# Patient Record
Sex: MALE | Race: WHITE | NOT HISPANIC OR LATINO | Employment: OTHER | URBAN - METROPOLITAN AREA
[De-identification: names, ages, dates, MRNs, and addresses within clinical notes are randomized per-mention and may not be internally consistent; named-entity substitution may affect disease eponyms.]

---

## 2023-03-07 LAB
ACTIVATED PARTIAL THROMBOPLASTIN TIME IN PPP BY COAGULATION ASSAY: 35 SEC (ref 26–39)
ALANINE AMINOTRANSFERASE (SGPT) (U/L) IN SER/PLAS: 12 U/L (ref 10–52)
ALBUMIN (G/DL) IN SER/PLAS: 3.5 G/DL (ref 3.4–5)
ALKALINE PHOSPHATASE (U/L) IN SER/PLAS: 68 U/L (ref 33–136)
ANION GAP IN SER/PLAS: 15 MMOL/L (ref 10–20)
ASPARTATE AMINOTRANSFERASE (SGOT) (U/L) IN SER/PLAS: 15 U/L (ref 9–39)
BASOPHILS (10*3/UL) IN BLOOD BY AUTOMATED COUNT: 0.01 X10E9/L (ref 0–0.1)
BASOPHILS/100 LEUKOCYTES IN BLOOD BY AUTOMATED COUNT: 0.3 % (ref 0–2)
BETA-2-MICROGLOBULIN (MG/L) IN SERUM: 2.9 MG/L (ref 0.7–2.2)
BILIRUBIN TOTAL (MG/DL) IN SER/PLAS: 0.6 MG/DL (ref 0–1.2)
CALCIUM (MG/DL) IN SER/PLAS: 8.6 MG/DL (ref 8.6–10.3)
CARBON DIOXIDE, TOTAL (MMOL/L) IN SER/PLAS: 27 MMOL/L (ref 21–32)
CHLORIDE (MMOL/L) IN SER/PLAS: 96 MMOL/L (ref 98–107)
CREATININE (MG/DL) IN SER/PLAS: 1.06 MG/DL (ref 0.5–1.3)
EOSINOPHILS (10*3/UL) IN BLOOD BY AUTOMATED COUNT: 0.02 X10E9/L (ref 0–0.4)
EOSINOPHILS/100 LEUKOCYTES IN BLOOD BY AUTOMATED COUNT: 0.5 % (ref 0–6)
ERYTHROCYTE DISTRIBUTION WIDTH (RATIO) BY AUTOMATED COUNT: 12.6 % (ref 11.5–14.5)
ERYTHROCYTE MEAN CORPUSCULAR HEMOGLOBIN CONCENTRATION (G/DL) BY AUTOMATED: 31.9 G/DL (ref 32–36)
ERYTHROCYTE MEAN CORPUSCULAR VOLUME (FL) BY AUTOMATED COUNT: 97 FL (ref 80–100)
ERYTHROCYTES (10*6/UL) IN BLOOD BY AUTOMATED COUNT: 3.98 X10E12/L (ref 4.5–5.9)
GFR MALE: 71 ML/MIN/1.73M2
GLUCOSE (MG/DL) IN SER/PLAS: 91 MG/DL (ref 74–99)
HEMATOCRIT (%) IN BLOOD BY AUTOMATED COUNT: 38.6 % (ref 41–52)
HEMOGLOBIN (G/DL) IN BLOOD: 12.3 G/DL (ref 13.5–17.5)
IGA (MG/DL) IN SER/PLAS: <7 MG/DL (ref 70–400)
IGG (MG/DL) IN SER/PLAS: 444 MG/DL (ref 700–1600)
IGM (MG/DL) IN SER/PLAS: 3720 MG/DL (ref 40–230)
IMMATURE GRANULOCYTES/100 LEUKOCYTES IN BLOOD BY AUTOMATED COUNT: 0.3 % (ref 0–0.9)
INR IN PPP BY COAGULATION ASSAY: 1.1 (ref 0.9–1.1)
LACTATE DEHYDROGENASE (U/L) IN SER/PLAS BY LAC->PYR RXN: 135 U/L (ref 84–246)
LEUKOCYTES (10*3/UL) IN BLOOD BY AUTOMATED COUNT: 3.8 X10E9/L (ref 4.4–11.3)
LYMPHOCYTES (10*3/UL) IN BLOOD BY AUTOMATED COUNT: 0.93 X10E9/L (ref 0.8–3)
LYMPHOCYTES/100 LEUKOCYTES IN BLOOD BY AUTOMATED COUNT: 24.8 % (ref 13–44)
MONOCYTES (10*3/UL) IN BLOOD BY AUTOMATED COUNT: 0.39 X10E9/L (ref 0.05–0.8)
MONOCYTES/100 LEUKOCYTES IN BLOOD BY AUTOMATED COUNT: 10.4 % (ref 2–10)
NEUTROPHILS (10*3/UL) IN BLOOD BY AUTOMATED COUNT: 2.39 X10E9/L (ref 1.6–5.5)
NEUTROPHILS/100 LEUKOCYTES IN BLOOD BY AUTOMATED COUNT: 63.7 % (ref 40–80)
PLATELETS (10*3/UL) IN BLOOD AUTOMATED COUNT: 167 X10E9/L (ref 150–450)
POTASSIUM (MMOL/L) IN SER/PLAS: 3.8 MMOL/L (ref 3.5–5.3)
PROTEIN TOTAL: 7.7 G/DL (ref 6.4–8.2)
PROTHROMBIN TIME (PT) IN PPP BY COAGULATION ASSAY: 12.6 SEC (ref 9.8–13.4)
SODIUM (MMOL/L) IN SER/PLAS: 134 MMOL/L (ref 136–145)
UREA NITROGEN (MG/DL) IN SER/PLAS: 13 MG/DL (ref 6–23)

## 2023-03-08 LAB — VISCOSITY SERUM: 3.59 CP (ref 1.5–1.8)

## 2023-03-13 LAB
ALBUMIN ELP: 3.4 G/DL (ref 3.4–5)
ALPHA 1: 0.2 G/DL (ref 0.2–0.6)
ALPHA 2: 0.6 G/DL (ref 0.4–1.1)
BETA: 1.5 G/DL (ref 0.5–1.2)
GAMMA GLOBULIN: 1.9 G/DL (ref 0.5–1.4)
M-PROTEIN 1: 0.6 G/DL
M-PROTEIN 2: 1 G/DL
PATH REVIEW - SERUM IMMUNOFIXATION: NORMAL
PATH REVIEW-SERUM PROTEIN ELECTROPHORESIS: NORMAL
PROTEIN ELECTROPHORESIS INTERPRETATION: ABNORMAL
PROTEIN TOTAL: 7.7 G/DL (ref 6.4–8.2)
SERUM IMMUNOFIXATION INTERPRETATION: ABNORMAL

## 2023-06-12 LAB
ALANINE AMINOTRANSFERASE (SGPT) (U/L) IN SER/PLAS: 13 U/L (ref 10–52)
ALBUMIN (G/DL) IN SER/PLAS: 3.5 G/DL (ref 3.4–5)
ALKALINE PHOSPHATASE (U/L) IN SER/PLAS: 70 U/L (ref 33–136)
ANION GAP IN SER/PLAS: 11 MMOL/L (ref 10–20)
ASPARTATE AMINOTRANSFERASE (SGOT) (U/L) IN SER/PLAS: 16 U/L (ref 9–39)
BASOPHILS (10*3/UL) IN BLOOD BY AUTOMATED COUNT: 0.02 X10E9/L (ref 0–0.1)
BASOPHILS/100 LEUKOCYTES IN BLOOD BY AUTOMATED COUNT: 0.5 % (ref 0–2)
BETA-2-MICROGLOBULIN (MG/L) IN SERUM: 3.5 MG/L (ref 0.7–2.2)
BILIRUBIN TOTAL (MG/DL) IN SER/PLAS: 0.5 MG/DL (ref 0–1.2)
CALCIUM (MG/DL) IN SER/PLAS: 9.2 MG/DL (ref 8.6–10.3)
CARBON DIOXIDE, TOTAL (MMOL/L) IN SER/PLAS: 30 MMOL/L (ref 21–32)
CHLORIDE (MMOL/L) IN SER/PLAS: 101 MMOL/L (ref 98–107)
CREATININE (MG/DL) IN SER/PLAS: 1.11 MG/DL (ref 0.5–1.3)
EOSINOPHILS (10*3/UL) IN BLOOD BY AUTOMATED COUNT: 0.02 X10E9/L (ref 0–0.4)
EOSINOPHILS/100 LEUKOCYTES IN BLOOD BY AUTOMATED COUNT: 0.5 % (ref 0–6)
ERYTHROCYTE DISTRIBUTION WIDTH (RATIO) BY AUTOMATED COUNT: 12.8 % (ref 11.5–14.5)
ERYTHROCYTE MEAN CORPUSCULAR HEMOGLOBIN CONCENTRATION (G/DL) BY AUTOMATED: 31.5 G/DL (ref 32–36)
ERYTHROCYTE MEAN CORPUSCULAR VOLUME (FL) BY AUTOMATED COUNT: 99 FL (ref 80–100)
ERYTHROCYTES (10*6/UL) IN BLOOD BY AUTOMATED COUNT: 3.83 X10E12/L (ref 4.5–5.9)
GFR MALE: 67 ML/MIN/1.73M2
GLUCOSE (MG/DL) IN SER/PLAS: 129 MG/DL (ref 74–99)
HEMATOCRIT (%) IN BLOOD BY AUTOMATED COUNT: 37.8 % (ref 41–52)
HEMOGLOBIN (G/DL) IN BLOOD: 11.9 G/DL (ref 13.5–17.5)
IGM (MG/DL) IN SER/PLAS: 3770 MG/DL (ref 40–230)
IMMATURE GRANULOCYTES/100 LEUKOCYTES IN BLOOD BY AUTOMATED COUNT: 0.3 % (ref 0–0.9)
LEUKOCYTES (10*3/UL) IN BLOOD BY AUTOMATED COUNT: 3.8 X10E9/L (ref 4.4–11.3)
LYMPHOCYTES (10*3/UL) IN BLOOD BY AUTOMATED COUNT: 0.92 X10E9/L (ref 0.8–3)
LYMPHOCYTES/100 LEUKOCYTES IN BLOOD BY AUTOMATED COUNT: 24 % (ref 13–44)
MONOCYTES (10*3/UL) IN BLOOD BY AUTOMATED COUNT: 0.26 X10E9/L (ref 0.05–0.8)
MONOCYTES/100 LEUKOCYTES IN BLOOD BY AUTOMATED COUNT: 6.8 % (ref 2–10)
NEUTROPHILS (10*3/UL) IN BLOOD BY AUTOMATED COUNT: 2.61 X10E9/L (ref 1.6–5.5)
NEUTROPHILS/100 LEUKOCYTES IN BLOOD BY AUTOMATED COUNT: 67.9 % (ref 40–80)
PLATELETS (10*3/UL) IN BLOOD AUTOMATED COUNT: 160 X10E9/L (ref 150–450)
POTASSIUM (MMOL/L) IN SER/PLAS: 4 MMOL/L (ref 3.5–5.3)
PROTEIN TOTAL: 7.8 G/DL (ref 6.4–8.2)
SODIUM (MMOL/L) IN SER/PLAS: 138 MMOL/L (ref 136–145)
UREA NITROGEN (MG/DL) IN SER/PLAS: 16 MG/DL (ref 6–23)

## 2023-06-13 LAB — VISCOSITY SERUM: 3.41 CP (ref 1.5–1.8)

## 2023-06-18 LAB
ALBUMIN ELP: 3.4 G/DL (ref 3.4–5)
ALPHA 1: 0.2 G/DL (ref 0.2–0.6)
ALPHA 2: 0.7 G/DL (ref 0.4–1.1)
BETA: 1.5 G/DL (ref 0.5–1.2)
GAMMA GLOBULIN: 2 G/DL (ref 0.5–1.4)
M-PROTEIN 1: 0.6 G/DL
M-PROTEIN 2: 1.2 G/DL
PATH REVIEW - SERUM IMMUNOFIXATION: NORMAL
PATH REVIEW-SERUM PROTEIN ELECTROPHORESIS: NORMAL
PROTEIN ELECTROPHORESIS INTERPRETATION: ABNORMAL
PROTEIN TOTAL: 7.8 G/DL (ref 6.4–8.2)
SERUM IMMUNOFIXATION INTERPRETATION: ABNORMAL

## 2023-06-22 LAB — SARS-COV-2 RESULT: NOT DETECTED

## 2023-09-07 LAB
ALANINE AMINOTRANSFERASE (SGPT) (U/L) IN SER/PLAS: 14 U/L (ref 10–52)
ALBUMIN (G/DL) IN SER/PLAS: 3.8 G/DL (ref 3.4–5)
ALKALINE PHOSPHATASE (U/L) IN SER/PLAS: 113 U/L (ref 33–136)
ANION GAP IN SER/PLAS: 11 MMOL/L (ref 10–20)
ASPARTATE AMINOTRANSFERASE (SGOT) (U/L) IN SER/PLAS: 18 U/L (ref 9–39)
BASOPHILS (10*3/UL) IN BLOOD BY AUTOMATED COUNT: 0.03 X10E9/L (ref 0–0.1)
BASOPHILS/100 LEUKOCYTES IN BLOOD BY AUTOMATED COUNT: 0.4 % (ref 0–2)
BILIRUBIN TOTAL (MG/DL) IN SER/PLAS: 0.5 MG/DL (ref 0–1.2)
CALCIUM (MG/DL) IN SER/PLAS: 9.1 MG/DL (ref 8.6–10.3)
CARBON DIOXIDE, TOTAL (MMOL/L) IN SER/PLAS: 29 MMOL/L (ref 21–32)
CHLORIDE (MMOL/L) IN SER/PLAS: 104 MMOL/L (ref 98–107)
CREATININE (MG/DL) IN SER/PLAS: 1.35 MG/DL (ref 0.5–1.3)
EOSINOPHILS (10*3/UL) IN BLOOD BY AUTOMATED COUNT: 0.04 X10E9/L (ref 0–0.4)
EOSINOPHILS/100 LEUKOCYTES IN BLOOD BY AUTOMATED COUNT: 0.6 % (ref 0–6)
ERYTHROCYTE DISTRIBUTION WIDTH (RATIO) BY AUTOMATED COUNT: 14.1 % (ref 11.5–14.5)
ERYTHROCYTE MEAN CORPUSCULAR HEMOGLOBIN CONCENTRATION (G/DL) BY AUTOMATED: 32.9 G/DL (ref 32–36)
ERYTHROCYTE MEAN CORPUSCULAR VOLUME (FL) BY AUTOMATED COUNT: 102 FL (ref 80–100)
ERYTHROCYTES (10*6/UL) IN BLOOD BY AUTOMATED COUNT: 3.62 X10E12/L (ref 4.5–5.9)
GFR MALE: 53 ML/MIN/1.73M2
GLUCOSE (MG/DL) IN SER/PLAS: 121 MG/DL (ref 74–99)
HEMATOCRIT (%) IN BLOOD BY AUTOMATED COUNT: 36.8 % (ref 41–52)
HEMOGLOBIN (G/DL) IN BLOOD: 12.1 G/DL (ref 13.5–17.5)
IMMATURE GRANULOCYTES/100 LEUKOCYTES IN BLOOD BY AUTOMATED COUNT: 0.6 % (ref 0–0.9)
LEUKOCYTES (10*3/UL) IN BLOOD BY AUTOMATED COUNT: 7 X10E9/L (ref 4.4–11.3)
LYMPHOCYTES (10*3/UL) IN BLOOD BY AUTOMATED COUNT: 0.16 X10E9/L (ref 0.8–3)
LYMPHOCYTES/100 LEUKOCYTES IN BLOOD BY AUTOMATED COUNT: 2.3 % (ref 13–44)
MONOCYTES (10*3/UL) IN BLOOD BY AUTOMATED COUNT: 0.55 X10E9/L (ref 0.05–0.8)
MONOCYTES/100 LEUKOCYTES IN BLOOD BY AUTOMATED COUNT: 7.8 % (ref 2–10)
NEUTROPHILS (10*3/UL) IN BLOOD BY AUTOMATED COUNT: 6.19 X10E9/L (ref 1.6–5.5)
NEUTROPHILS/100 LEUKOCYTES IN BLOOD BY AUTOMATED COUNT: 88.3 % (ref 40–80)
PLATELETS (10*3/UL) IN BLOOD AUTOMATED COUNT: 153 X10E9/L (ref 150–450)
POTASSIUM (MMOL/L) IN SER/PLAS: 4.8 MMOL/L (ref 3.5–5.3)
PROTEIN TOTAL: 6.9 G/DL (ref 6.4–8.2)
SODIUM (MMOL/L) IN SER/PLAS: 139 MMOL/L (ref 136–145)
UREA NITROGEN (MG/DL) IN SER/PLAS: 24 MG/DL (ref 6–23)

## 2023-10-04 PROBLEM — H52.203 ASTIGMATISM OF BOTH EYES WITH PRESBYOPIA: Status: ACTIVE | Noted: 2023-10-04

## 2023-10-04 PROBLEM — Z86.69 HISTORY OF RETINAL DETACHMENT: Status: ACTIVE | Noted: 2023-10-04

## 2023-10-04 PROBLEM — C88.00: Status: ACTIVE | Noted: 2023-10-04

## 2023-10-04 PROBLEM — H52.4 ASTIGMATISM OF BOTH EYES WITH PRESBYOPIA: Status: ACTIVE | Noted: 2023-10-04

## 2023-10-04 PROBLEM — H34.239 BRAO (BRANCH RETINAL ARTERY OCCLUSION): Status: ACTIVE | Noted: 2023-10-04

## 2023-10-04 PROBLEM — B02.9 VARICELLA-ZOSTER: Status: ACTIVE | Noted: 2023-10-04

## 2023-10-04 PROBLEM — I72.8 SPLENIC ARTERY ANEURYSM (CMS-HCC): Status: ACTIVE | Noted: 2023-10-04

## 2023-10-04 PROBLEM — Z85.9 PERSONAL HISTORY OF MALIGNANT NEOPLASM, UNSPECIFIED: Status: ACTIVE | Noted: 2023-10-04

## 2023-10-04 PROBLEM — C88.0: Status: ACTIVE | Noted: 2023-10-04

## 2023-10-04 PROBLEM — C91.40 HAIRY CELL LEUKEMIA (MULTI): Status: ACTIVE | Noted: 2023-10-04

## 2023-10-04 RX ORDER — ASPIRIN 81 MG/1
TABLET ORAL
COMMUNITY

## 2023-10-05 ENCOUNTER — OFFICE VISIT (OUTPATIENT)
Dept: HEMATOLOGY/ONCOLOGY | Facility: HOSPITAL | Age: 83
End: 2023-10-05
Payer: MEDICARE

## 2023-10-05 VITALS
DIASTOLIC BLOOD PRESSURE: 76 MMHG | RESPIRATION RATE: 16 BRPM | HEIGHT: 71 IN | OXYGEN SATURATION: 99 % | SYSTOLIC BLOOD PRESSURE: 138 MMHG | TEMPERATURE: 97.5 F | WEIGHT: 217.5 LBS | BODY MASS INDEX: 30.45 KG/M2 | HEART RATE: 72 BPM

## 2023-10-05 DIAGNOSIS — C88.0: Primary | ICD-10-CM

## 2023-10-05 PROCEDURE — 1125F AMNT PAIN NOTED PAIN PRSNT: CPT | Performed by: NURSE PRACTITIONER

## 2023-10-05 PROCEDURE — 99213 OFFICE O/P EST LOW 20 MIN: CPT | Performed by: NURSE PRACTITIONER

## 2023-10-05 PROCEDURE — 1159F MED LIST DOCD IN RCRD: CPT | Performed by: NURSE PRACTITIONER

## 2023-10-05 PROCEDURE — 1036F TOBACCO NON-USER: CPT | Performed by: NURSE PRACTITIONER

## 2023-10-05 ASSESSMENT — ENCOUNTER SYMPTOMS
DEPRESSION: 0
OCCASIONAL FEELINGS OF UNSTEADINESS: 0
LOSS OF SENSATION IN FEET: 0

## 2023-10-05 ASSESSMENT — PAIN SCALES - GENERAL: PAINLEVEL: 6

## 2023-10-09 ASSESSMENT — ENCOUNTER SYMPTOMS
CARDIOVASCULAR NEGATIVE: 1
PSYCHIATRIC NEGATIVE: 1
ENDOCRINE NEGATIVE: 1
RESPIRATORY NEGATIVE: 1
CONSTITUTIONAL NEGATIVE: 1
NEUROLOGICAL NEGATIVE: 1
HEMATOLOGIC/LYMPHATIC NEGATIVE: 1
MUSCULOSKELETAL NEGATIVE: 1
GASTROINTESTINAL NEGATIVE: 1
EYES NEGATIVE: 1

## 2023-10-09 NOTE — PROGRESS NOTES
Patient ID: Carlton Rose is a 82 y.o. male.  Referring Physician: Snow Pickett, APRN-CNP  [address]  Department of Hematology [place] [address]  Primary Care Provider: Willie Viera MD  Visit Type: Follow Up      Subjective    For LPL MZL in 2013  rituximab, chlorambucil, bendamustine. reported to have severe reactions to rituximab, managed with heavy premedication.     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 He was previously treated with plasmapheresis followed by rituximab, but had severe infusion reaction. He then switched a different premedication regimen, with dexamethasone that was started 1 day before the infusion. Other regimens were described above. He tolerated the treatment well. He has not been treated for the past few years.   Upon evaluating in June 2023, his presentation was consistent with WM in relapse. IgM was 3770 mg/dL. It was decided to started R-andrea. With his history of reaction, first dose ritux was given as an inpt and well tolerated. So far he completed 2 cycles of R-andrea, with C1 andrea at 60 and 70mg/m2, and C2 andrea dose at 90 mg/m2.  The patient presents for follow up visit prior to C5 BR (at [facility]). Due to receive C5 on 10/19-10/20/23. Tolerating therapy well with mild fatigue. Able to complete daily activities without limitation.   No fever, chills or night sweats. No lymphadenopathy or masses.             Review of Systems   Constitutional: Negative.    HENT:  Negative.    "  Eyes: Negative.    Respiratory: Negative.     Cardiovascular: Negative.    Gastrointestinal: Negative.    Endocrine: Negative.    Genitourinary: Negative.     Musculoskeletal: Negative.    Skin: Negative.    Neurological: Negative.    Hematological: Negative.    Psychiatric/Behavioral: Negative.          Objective   BSA: 2.23 meters squared  /76 (BP Location: Left arm, Patient Position: Sitting, BP Cuff Size: Large adult)   Pulse 72   Temp 36.4 °C (97.5 °F) (Temporal)   Resp 16   Ht (S) 1.807 m (5' 11.14\") Comment: height verification  Wt 98.7 kg (217 lb 8 oz)   SpO2 99%   BMI 30.21 kg/m²      has a past medical history of Aneurysm of other specified arteries (CMS/HCC), Decreased white blood cell count, unspecified, Familial hypercholesterolemia, Nonfamilial hypogammaglobulinemia (CMS/HCC), Other conditions influencing health status, Other conditions influencing health status, Personal history of diseases of the blood and blood-forming organs and certain disorders involving the immune mechanism, and Personal history of diseases of the blood and blood-forming organs and certain disorders involving the immune mechanism.   has a past surgical history that includes Cataract extraction (09/05/2013) and Other surgical history (12/04/2020).  Family History   Problem Relation Name Age of Onset    Alzheimer's disease Mother      Dementia Mother      Cataracts Mother      Heart failure Father      Cataracts Father      Hypertension Brother      Peripheral vascular disease Brother       Oncology History    No history exists.       Carlton Rose  reports that he has never smoked. He has never used smokeless tobacco.  He  reports no history of alcohol use.  He  reports no history of drug use.    Physical Exam  Vitals reviewed.   Constitutional:       Appearance: Normal appearance.   HENT:      Head: Normocephalic and atraumatic.      Nose: Nose normal.      Mouth/Throat:      Mouth: Mucous membranes are moist.     "  Pharynx: Oropharynx is clear.   Eyes:      Extraocular Movements: Extraocular movements intact.      Conjunctiva/sclera: Conjunctivae normal.      Pupils: Pupils are equal, round, and reactive to light.   Cardiovascular:      Rate and Rhythm: Normal rate and regular rhythm.      Pulses: Normal pulses.      Heart sounds: Normal heart sounds.   Pulmonary:      Effort: Pulmonary effort is normal.      Breath sounds: Normal breath sounds.   Abdominal:      General: Bowel sounds are normal.      Palpations: Abdomen is soft.   Musculoskeletal:         General: Normal range of motion.      Cervical back: Normal range of motion.   Skin:     General: Skin is warm and dry.   Neurological:      General: No focal deficit present.      Mental Status: He is alert and oriented to person, place, and time.   Psychiatric:         Mood and Affect: Mood normal.         Behavior: Behavior normal.         Thought Content: Thought content normal.         Judgment: Judgment normal.         WBC   Date/Time Value Ref Range Status   09/21/2023 09:40 AM 3.7 (L) 4.4 - 11.3 x10E9/L Final   09/07/2023 11:00 AM 7.0 4.4 - 11.3 x10E9/L Final   08/24/2023 09:20 AM 5.6 4.4 - 11.3 x10E9/L Final     nRBC   Date Value Ref Range Status   06/26/2023 0.0 0.0 - 0.0 /100 WBC Final   06/25/2023 0.0 0.0 - 0.0 /100 WBC Final   06/24/2023 0.0 0.0 - 0.0 /100 WBC Final     RBC   Date Value Ref Range Status   09/21/2023 3.47 (L) 4.50 - 5.90 x10E12/L Final   09/07/2023 3.62 (L) 4.50 - 5.90 x10E12/L Final   08/24/2023 3.71 (L) 4.50 - 5.90 x10E12/L Final     Hemoglobin   Date Value Ref Range Status   09/21/2023 11.7 (L) 13.5 - 17.5 g/dL Final   09/07/2023 12.1 (L) 13.5 - 17.5 g/dL Final   08/24/2023 12.0 (L) 13.5 - 17.5 g/dL Final     Hematocrit   Date Value Ref Range Status   09/21/2023 34.0 (L) 41.0 - 52.0 % Final   09/07/2023 36.8 (L) 41.0 - 52.0 % Final   08/24/2023 35.8 (L) 41.0 - 52.0 % Final     MCV   Date/Time Value Ref Range Status   09/21/2023 09:40 AM 98 80  "- 100 fL Final   09/07/2023 11:00  (H) 80 - 100 fL Final   08/24/2023 09:20 AM 96 80 - 100 fL Final     No results found for: \"MCH\"  MCHC   Date/Time Value Ref Range Status   09/21/2023 09:40 AM 34.4 32.0 - 36.0 g/dL Final   09/07/2023 11:00 AM 32.9 32.0 - 36.0 g/dL Final   08/24/2023 09:20 AM 33.5 32.0 - 36.0 g/dL Final     RDW   Date/Time Value Ref Range Status   09/21/2023 09:40 AM 13.5 11.5 - 14.5 % Final   09/07/2023 11:00 AM 14.1 11.5 - 14.5 % Final   08/24/2023 09:20 AM 13.6 11.5 - 14.5 % Final     Platelets   Date/Time Value Ref Range Status   09/21/2023 09:40  150 - 450 x10E9/L Final   09/07/2023 11:00  150 - 450 x10E9/L Final   08/24/2023 09:20  (L) 150 - 450 x10E9/L Final     No results found for: \"MPV\"  Neutrophils %   Date/Time Value Ref Range Status   09/21/2023 09:40 AM 94.5 40.0 - 80.0 % Final   09/07/2023 11:00 AM 88.3 40.0 - 80.0 % Final   08/24/2023 09:20 AM 97.7 40.0 - 80.0 % Final     Immature Granulocytes %, Automated   Date/Time Value Ref Range Status   09/21/2023 09:40 AM 0.3 0.0 - 0.9 % Final     Comment:      Immature Granulocyte Count (IG) includes promyelocytes,    myelocytes and metamyelocytes but does not include bands.   Percent differential counts (%) should be interpreted in the   context of the absolute cell counts (cells/L).     09/07/2023 11:00 AM 0.6 0.0 - 0.9 % Final     Comment:      Immature Granulocyte Count (IG) includes promyelocytes,    myelocytes and metamyelocytes but does not include bands.   Percent differential counts (%) should be interpreted in the   context of the absolute cell counts (cells/L).     08/24/2023 09:20 AM 0.2 0.0 - 0.9 % Final     Comment:      Immature Granulocyte Count (IG) includes promyelocytes,    myelocytes and metamyelocytes but does not include bands.   Percent differential counts (%) should be interpreted in the   context of the absolute cell counts (cells/L).       Lymphocytes %   Date/Time Value Ref Range Status " "  09/21/2023 09:40 AM 3.8 13.0 - 44.0 % Final   09/07/2023 11:00 AM 2.3 13.0 - 44.0 % Final   08/24/2023 09:20 AM 1.4 13.0 - 44.0 % Final     Monocytes %   Date/Time Value Ref Range Status   09/21/2023 09:40 AM 1.1 2.0 - 10.0 % Final   09/07/2023 11:00 AM 7.8 2.0 - 10.0 % Final   08/24/2023 09:20 AM 0.7 2.0 - 10.0 % Final     Eosinophils %   Date/Time Value Ref Range Status   09/07/2023 11:00 AM 0.6 0.0 - 6.0 % Final   08/10/2023 03:02 PM 0.5 0.0 - 6.0 % Final   07/13/2023 01:47 PM 0.5 0.0 - 6.0 % Final     Basophils %   Date/Time Value Ref Range Status   09/21/2023 09:40 AM 0.3 0.0 - 2.0 % Final   09/07/2023 11:00 AM 0.4 0.0 - 2.0 % Final   08/10/2023 03:02 PM 0.3 0.0 - 2.0 % Final     Neutrophils Absolute   Date/Time Value Ref Range Status   09/21/2023 09:40 AM 3.46 1.60 - 5.50 x10E9/L Final   09/07/2023 11:00 AM 6.19 (H) 1.60 - 5.50 x10E9/L Final   08/24/2023 09:20 AM 5.49 1.60 - 5.50 x10E9/L Final     No results found for: \"IGABSOL\"  Lymphocytes Absolute   Date/Time Value Ref Range Status   09/21/2023 09:40 AM 0.14 (L) 0.80 - 3.00 x10E9/L Final   09/07/2023 11:00 AM 0.16 (L) 0.80 - 3.00 x10E9/L Final   08/24/2023 09:20 AM 0.08 (L) 0.80 - 3.00 x10E9/L Final     Monocytes Absolute   Date/Time Value Ref Range Status   09/21/2023 09:40 AM 0.04 (L) 0.05 - 0.80 x10E9/L Final   09/07/2023 11:00 AM 0.55 0.05 - 0.80 x10E9/L Final   08/24/2023 09:20 AM 0.04 (L) 0.05 - 0.80 x10E9/L Final     Eosinophils Absolute   Date/Time Value Ref Range Status   09/07/2023 11:00 AM 0.04 0.00 - 0.40 x10E9/L Final   08/10/2023 03:02 PM 0.04 0.00 - 0.40 x10E9/L Final   07/13/2023 01:47 PM 0.04 0.00 - 0.40 x10E9/L Final     Basophils Absolute   Date/Time Value Ref Range Status   09/21/2023 09:40 AM 0.01 0.00 - 0.10 x10E9/L Final   09/07/2023 11:00 AM 0.03 0.00 - 0.10 x10E9/L Final   08/10/2023 03:02 PM 0.02 0.00 - 0.10 x10E9/L Final       No components found for: \"PT\"  aPTT   Date/Time Value Ref Range Status   06/24/2023 10:18 PM 28 27 - 38 " sec Final     Comment:     Note new reference range as of 6/20/2023 at 10:00am.   03/07/2023 11:33 AM 35 26 - 39 sec Final     Comment:       THE APTT IS NO LONGER USED FOR MONITORING     UNFRACTIONATED HEPARIN THERAPY.    FOR MONITORING HEPARIN THERAPY,     USE THE HEPARIN ASSAY.         Assessment/Plan         Problem List Items Addressed This Visit             ICD-10-CM       Hematology and Neoplasia    Waldenstrom's macroglobulinemia with kappa light chains (CMS/HCC) - Primary C88.0     #WM  -Based on the BMBx in 2020, the MYD88 mutation, and the increase in IgM and viscosity, the diagnosis is consistent with WM.   -IgM has been steadily increasing, from the low of 1070 in Jan 2021 to 3770 mg/dL in June 2023.   -In addition, both hgb and PLT are mildly decreased.   -Given his age, this may be an ideal time to intervene, with the goal to reduce disease burden, prevent disease related complications.     #Treatment  -Discussed BTKi, vs rituximab, and R-Anthony.   -patient is more interested in a treatment with a finite duration. Therefore discussed thoroughly the AEs of rituximab, bendamustine, and the drugs in combination. In particular, risks of severe/fatal infections were discussed. PML was also discussed. Severe and protracted cytoepenia or aplasia were also discussed. Patient consented.   -Appropriate testing for HBV and other prophylaxis will be taken, including G-CSF, Bactrim for PJP, and acyclovir.   -Given severe reaction to ritux, he will receive 1st dose of ritux as an inpatient. Subsequently he will receive R-Anthony as an outpatient.   -The above approach proved safe. He tolerated 4 cycles of Rbenda.   Plan:    C5 Rbenda scheduled on 10/19-10/20/23.    Prophylaxis: acyclovir, gcsf, bactrim.   -RTC in 3 weeks with MD. Snow Pickett, APRN-CNP

## 2023-10-09 NOTE — ASSESSMENT & PLAN NOTE
#WM  -Based on the BMBx in 2020, the MYD88 mutation, and the increase in IgM and viscosity, the diagnosis is consistent with WM.   -IgM has been steadily increasing, from the low of 1070 in Jan 2021 to 3770 mg/dL in June 2023.   -In addition, both hgb and PLT are mildly decreased.   -Given his age, this may be an ideal time to intervene, with the goal to reduce disease burden, prevent disease related complications.     #Treatment  -Discussed BTKi, vs rituximab, and R-Anthony.   -patient is more interested in a treatment with a finite duration. Therefore discussed thoroughly the AEs of rituximab, bendamustine, and the drugs in combination. In particular, risks of severe/fatal infections were discussed. PML was also discussed. Severe and protracted cytoepenia or aplasia were also discussed. Patient consented.   -Appropriate testing for HBV and other prophylaxis will be taken, including G-CSF, Bactrim for PJP, and acyclovir.   -Given severe reaction to ritux, he will receive 1st dose of ritux as an inpatient. Subsequently he will receive R-Anthony as an outpatient.   -The above approach proved safe. He tolerated 4 cycles of Rbenda.   Plan:    C5 Rbenda scheduled on 10/19-10/20/23.    Prophylaxis: acyclovir, gcsf, bactrim.   -RTC in 3 weeks with MD.

## 2023-10-19 ENCOUNTER — SOCIAL WORK (OUTPATIENT)
Dept: CASE MANAGEMENT | Facility: HOSPITAL | Age: 83
End: 2023-10-19
Payer: MEDICARE

## 2023-10-19 ENCOUNTER — INFUSION (OUTPATIENT)
Dept: HEMATOLOGY/ONCOLOGY | Facility: CLINIC | Age: 83
End: 2023-10-19
Payer: MEDICARE

## 2023-10-19 VITALS
OXYGEN SATURATION: 95 % | HEART RATE: 74 BPM | TEMPERATURE: 98.1 F | SYSTOLIC BLOOD PRESSURE: 124 MMHG | DIASTOLIC BLOOD PRESSURE: 66 MMHG | HEIGHT: 71 IN | RESPIRATION RATE: 16 BRPM | WEIGHT: 217.37 LBS | BODY MASS INDEX: 30.43 KG/M2

## 2023-10-19 DIAGNOSIS — C88.0: ICD-10-CM

## 2023-10-19 DIAGNOSIS — C88.0: Primary | ICD-10-CM

## 2023-10-19 LAB
ALBUMIN SERPL BCP-MCNC: 3.8 G/DL (ref 3.4–5)
ALP SERPL-CCNC: 81 U/L (ref 33–136)
ALT SERPL W P-5'-P-CCNC: 11 U/L (ref 10–52)
ANION GAP SERPL CALC-SCNC: 13 MMOL/L (ref 10–20)
AST SERPL W P-5'-P-CCNC: 15 U/L (ref 9–39)
BASOPHILS # BLD AUTO: 0 X10*3/UL (ref 0–0.1)
BASOPHILS NFR BLD AUTO: 0 %
BILIRUB SERPL-MCNC: 0.5 MG/DL (ref 0–1.2)
BUN SERPL-MCNC: 21 MG/DL (ref 6–23)
CALCIUM SERPL-MCNC: 9.1 MG/DL (ref 8.6–10.3)
CHLORIDE SERPL-SCNC: 104 MMOL/L (ref 98–107)
CO2 SERPL-SCNC: 24 MMOL/L (ref 21–32)
CREAT SERPL-MCNC: 1.19 MG/DL (ref 0.5–1.3)
EOSINOPHIL # BLD AUTO: 0 X10*3/UL (ref 0–0.4)
EOSINOPHIL NFR BLD AUTO: 0 %
ERYTHROCYTE [DISTWIDTH] IN BLOOD BY AUTOMATED COUNT: 12.9 % (ref 11.5–14.5)
GFR SERPL CREATININE-BSD FRML MDRD: 62 ML/MIN/1.73M*2
GLUCOSE SERPL-MCNC: 167 MG/DL (ref 74–99)
HCT VFR BLD AUTO: 35.8 % (ref 41–52)
HGB BLD-MCNC: 12.2 G/DL (ref 13.5–17.5)
IMM GRANULOCYTES # BLD AUTO: 0.01 X10*3/UL (ref 0–0.5)
IMM GRANULOCYTES NFR BLD AUTO: 0.2 % (ref 0–0.9)
LDH SERPL L TO P-CCNC: 160 U/L (ref 84–246)
LYMPHOCYTES # BLD AUTO: 0.14 X10*3/UL (ref 0.8–3)
LYMPHOCYTES NFR BLD AUTO: 2.7 %
MCH RBC QN AUTO: 33.6 PG (ref 26–34)
MCHC RBC AUTO-ENTMCNC: 34.1 G/DL (ref 32–36)
MCV RBC AUTO: 99 FL (ref 80–100)
MONOCYTES # BLD AUTO: 0.05 X10*3/UL (ref 0.05–0.8)
MONOCYTES NFR BLD AUTO: 1 %
NEUTROPHILS # BLD AUTO: 4.99 X10*3/UL (ref 1.6–5.5)
NEUTROPHILS NFR BLD AUTO: 96.1 %
PLATELET # BLD AUTO: 152 X10*3/UL (ref 150–450)
PMV BLD AUTO: 9.2 FL (ref 7.5–11.5)
POTASSIUM SERPL-SCNC: 4.4 MMOL/L (ref 3.5–5.3)
PROT SERPL-MCNC: 6.6 G/DL (ref 6.4–8.2)
RBC # BLD AUTO: 3.63 X10*6/UL (ref 4.5–5.9)
SODIUM SERPL-SCNC: 137 MMOL/L (ref 136–145)
URATE SERPL-MCNC: 5.1 MG/DL (ref 4–7.5)
WBC # BLD AUTO: 5.2 X10*3/UL (ref 4.4–11.3)

## 2023-10-19 PROCEDURE — 96367 TX/PROPH/DG ADDL SEQ IV INF: CPT

## 2023-10-19 PROCEDURE — 84550 ASSAY OF BLOOD/URIC ACID: CPT

## 2023-10-19 PROCEDURE — 96366 THER/PROPH/DIAG IV INF ADDON: CPT

## 2023-10-19 PROCEDURE — 83615 LACTATE (LD) (LDH) ENZYME: CPT

## 2023-10-19 PROCEDURE — 84075 ASSAY ALKALINE PHOSPHATASE: CPT

## 2023-10-19 PROCEDURE — 96409 CHEMO IV PUSH SNGL DRUG: CPT

## 2023-10-19 PROCEDURE — 2500000001 HC RX 250 WO HCPCS SELF ADMINISTERED DRUGS (ALT 637 FOR MEDICARE OP): Performed by: INTERNAL MEDICINE

## 2023-10-19 PROCEDURE — 2500000004 HC RX 250 GENERAL PHARMACY W/ HCPCS (ALT 636 FOR OP/ED): Mod: JG | Performed by: INTERNAL MEDICINE

## 2023-10-19 PROCEDURE — 36415 COLL VENOUS BLD VENIPUNCTURE: CPT

## 2023-10-19 PROCEDURE — 85025 COMPLETE CBC W/AUTO DIFF WBC: CPT

## 2023-10-19 PROCEDURE — 96375 TX/PRO/DX INJ NEW DRUG ADDON: CPT

## 2023-10-19 PROCEDURE — 2500000004 HC RX 250 GENERAL PHARMACY W/ HCPCS (ALT 636 FOR OP/ED): Performed by: INTERNAL MEDICINE

## 2023-10-19 RX ORDER — DIPHENHYDRAMINE HYDROCHLORIDE 50 MG/ML
50 INJECTION INTRAMUSCULAR; INTRAVENOUS AS NEEDED
Status: CANCELLED | OUTPATIENT
Start: 2023-10-21

## 2023-10-19 RX ORDER — ACETAMINOPHEN 325 MG/1
650 TABLET ORAL ONCE
Status: CANCELLED | OUTPATIENT
Start: 2023-10-19

## 2023-10-19 RX ORDER — PROCHLORPERAZINE EDISYLATE 5 MG/ML
10 INJECTION INTRAMUSCULAR; INTRAVENOUS EVERY 6 HOURS PRN
Status: DISCONTINUED | OUTPATIENT
Start: 2023-10-19 | End: 2023-10-19 | Stop reason: HOSPADM

## 2023-10-19 RX ORDER — HEPARIN 100 UNIT/ML
500 SYRINGE INTRAVENOUS AS NEEDED
OUTPATIENT
Start: 2023-10-19

## 2023-10-19 RX ORDER — HEPARIN SODIUM,PORCINE/PF 10 UNIT/ML
50 SYRINGE (ML) INTRAVENOUS AS NEEDED
OUTPATIENT
Start: 2023-10-19

## 2023-10-19 RX ORDER — ACETAMINOPHEN 325 MG/1
650 TABLET ORAL ONCE
Status: COMPLETED | OUTPATIENT
Start: 2023-10-19 | End: 2023-10-19

## 2023-10-19 RX ORDER — PALONOSETRON 0.05 MG/ML
0.25 INJECTION, SOLUTION INTRAVENOUS ONCE
Status: COMPLETED | OUTPATIENT
Start: 2023-10-19 | End: 2023-10-19

## 2023-10-19 RX ORDER — EPINEPHRINE 0.3 MG/.3ML
0.3 INJECTION SUBCUTANEOUS EVERY 5 MIN PRN
Status: DISCONTINUED | OUTPATIENT
Start: 2023-10-19 | End: 2023-10-19 | Stop reason: HOSPADM

## 2023-10-19 RX ORDER — PROCHLORPERAZINE MALEATE 10 MG
10 TABLET ORAL EVERY 6 HOURS PRN
Status: CANCELLED | OUTPATIENT
Start: 2023-10-20

## 2023-10-19 RX ORDER — DIPHENHYDRAMINE HYDROCHLORIDE 50 MG/ML
50 INJECTION INTRAMUSCULAR; INTRAVENOUS AS NEEDED
Status: DISCONTINUED | OUTPATIENT
Start: 2023-10-19 | End: 2023-10-19 | Stop reason: HOSPADM

## 2023-10-19 RX ORDER — FAMOTIDINE 10 MG/ML
40 INJECTION INTRAVENOUS ONCE
Status: CANCELLED
Start: 2023-10-19 | End: 2023-10-19

## 2023-10-19 RX ORDER — FAMOTIDINE 10 MG/ML
20 INJECTION INTRAVENOUS ONCE AS NEEDED
Status: CANCELLED | OUTPATIENT
Start: 2023-10-19

## 2023-10-19 RX ORDER — EPINEPHRINE 0.3 MG/.3ML
0.3 INJECTION SUBCUTANEOUS EVERY 5 MIN PRN
Status: CANCELLED | OUTPATIENT
Start: 2023-10-21

## 2023-10-19 RX ORDER — FAMOTIDINE 10 MG/ML
20 INJECTION INTRAVENOUS ONCE AS NEEDED
Status: CANCELLED | OUTPATIENT
Start: 2023-10-20

## 2023-10-19 RX ORDER — EPINEPHRINE 0.3 MG/.3ML
0.3 INJECTION SUBCUTANEOUS EVERY 5 MIN PRN
Status: CANCELLED | OUTPATIENT
Start: 2023-10-20

## 2023-10-19 RX ORDER — ALBUTEROL SULFATE 0.83 MG/ML
3 SOLUTION RESPIRATORY (INHALATION) AS NEEDED
Status: CANCELLED | OUTPATIENT
Start: 2023-10-19

## 2023-10-19 RX ORDER — PROCHLORPERAZINE EDISYLATE 5 MG/ML
10 INJECTION INTRAMUSCULAR; INTRAVENOUS EVERY 6 HOURS PRN
Status: CANCELLED | OUTPATIENT
Start: 2023-10-19

## 2023-10-19 RX ORDER — DIPHENHYDRAMINE HYDROCHLORIDE 50 MG/ML
50 INJECTION INTRAMUSCULAR; INTRAVENOUS AS NEEDED
Status: CANCELLED | OUTPATIENT
Start: 2023-10-19

## 2023-10-19 RX ORDER — EPINEPHRINE 0.3 MG/.3ML
0.3 INJECTION SUBCUTANEOUS EVERY 5 MIN PRN
Status: CANCELLED | OUTPATIENT
Start: 2023-10-19

## 2023-10-19 RX ORDER — PROCHLORPERAZINE MALEATE 10 MG
10 TABLET ORAL EVERY 6 HOURS PRN
Status: DISCONTINUED | OUTPATIENT
Start: 2023-10-19 | End: 2023-10-19 | Stop reason: HOSPADM

## 2023-10-19 RX ORDER — DIPHENHYDRAMINE HCL 50 MG
50 CAPSULE ORAL ONCE
Status: CANCELLED | OUTPATIENT
Start: 2023-10-19

## 2023-10-19 RX ORDER — DIPHENHYDRAMINE HYDROCHLORIDE 50 MG/ML
50 INJECTION INTRAMUSCULAR; INTRAVENOUS AS NEEDED
Status: CANCELLED | OUTPATIENT
Start: 2023-10-20

## 2023-10-19 RX ORDER — PROCHLORPERAZINE MALEATE 10 MG
10 TABLET ORAL EVERY 6 HOURS PRN
Status: CANCELLED | OUTPATIENT
Start: 2023-10-19

## 2023-10-19 RX ORDER — ALBUTEROL SULFATE 0.83 MG/ML
3 SOLUTION RESPIRATORY (INHALATION) AS NEEDED
Status: CANCELLED | OUTPATIENT
Start: 2023-10-20

## 2023-10-19 RX ORDER — FAMOTIDINE 10 MG/ML
20 INJECTION INTRAVENOUS ONCE AS NEEDED
Status: CANCELLED | OUTPATIENT
Start: 2023-10-21

## 2023-10-19 RX ORDER — DIPHENHYDRAMINE HCL 25 MG
50 CAPSULE ORAL ONCE
Status: COMPLETED | OUTPATIENT
Start: 2023-10-19 | End: 2023-10-19

## 2023-10-19 RX ORDER — PROCHLORPERAZINE EDISYLATE 5 MG/ML
10 INJECTION INTRAMUSCULAR; INTRAVENOUS EVERY 6 HOURS PRN
Status: CANCELLED | OUTPATIENT
Start: 2023-10-20

## 2023-10-19 RX ORDER — PALONOSETRON 0.05 MG/ML
0.25 INJECTION, SOLUTION INTRAVENOUS ONCE
Status: CANCELLED | OUTPATIENT
Start: 2023-10-19

## 2023-10-19 RX ORDER — FAMOTIDINE 10 MG/ML
20 INJECTION INTRAVENOUS ONCE AS NEEDED
Status: DISCONTINUED | OUTPATIENT
Start: 2023-10-19 | End: 2023-10-19 | Stop reason: HOSPADM

## 2023-10-19 RX ORDER — ALBUTEROL SULFATE 0.83 MG/ML
3 SOLUTION RESPIRATORY (INHALATION) AS NEEDED
Status: DISCONTINUED | OUTPATIENT
Start: 2023-10-19 | End: 2023-10-19 | Stop reason: HOSPADM

## 2023-10-19 RX ORDER — FAMOTIDINE 10 MG/ML
40 INJECTION INTRAVENOUS ONCE
Status: COMPLETED | OUTPATIENT
Start: 2023-10-19 | End: 2023-10-19

## 2023-10-19 RX ORDER — ALBUTEROL SULFATE 0.83 MG/ML
3 SOLUTION RESPIRATORY (INHALATION) AS NEEDED
Status: CANCELLED | OUTPATIENT
Start: 2023-10-21

## 2023-10-19 RX ADMIN — FAMOTIDINE 40 MG: 10 INJECTION INTRAVENOUS at 10:39

## 2023-10-19 RX ADMIN — ACETAMINOPHEN 650 MG: 325 TABLET ORAL at 10:40

## 2023-10-19 RX ADMIN — Medication 12 MG: at 10:15

## 2023-10-19 RX ADMIN — DIPHENHYDRAMINE HYDROCHLORIDE 50 MG: 25 CAPSULE ORAL at 10:40

## 2023-10-19 RX ADMIN — METHYLPREDNISOLONE SODIUM SUCCINATE 20 MG: 40 INJECTION, POWDER, FOR SOLUTION INTRAMUSCULAR; INTRAVENOUS at 10:15

## 2023-10-19 RX ADMIN — METHYLPREDNISOLONE SODIUM SUCCINATE 40 MG: 40 INJECTION, POWDER, FOR SOLUTION INTRAMUSCULAR; INTRAVENOUS at 10:39

## 2023-10-19 RX ADMIN — BENDAMUSTINE HYDROCHLORIDE 180 MG: 25 INJECTION, SOLUTION INTRAVENOUS at 16:19

## 2023-10-19 RX ADMIN — PALONOSETRON 250 MCG: 0.05 INJECTION, SOLUTION INTRAVENOUS at 10:39

## 2023-10-19 RX ADMIN — RITUXIMAB 800 MG: 10 INJECTION, SOLUTION INTRAVENOUS at 11:57

## 2023-10-19 ASSESSMENT — PAIN SCALES - GENERAL: PAINLEVEL: 0-NO PAIN

## 2023-10-19 NOTE — SIGNIFICANT EVENT
10/19/23 1002   Prechemo Checklist   Has the patient been in the hospital, ED, or urgent care since last date of service No   Chemo/Immuno Consent Signed Yes   Protocol/Indications Verified Yes   Confirmed to previous date/time of medication Yes   Compared to previous dose Yes   All medications are dated accurately Yes   Pregnancy Test Negative Not applicable   Parameters Met Yes   BSA/Weight-Height Verified Yes   Dose Calculations Verified Yes

## 2023-10-19 NOTE — PROGRESS NOTES
SW met with patient and his daughter today to assess needs and offer support.  Patient was A&Ox3 with appropriate and congruent mood and affect. SW asked how his treatment has been going.  Patient stated that he has been more tired.  Patient stated that he was going to get through it.  Patient's daughter helped him set up his My Chart.  Patient reported no SW needs at this time.  SW will continue to follow patient.      Andrew Dawkins MSW, LSW

## 2023-10-19 NOTE — PROGRESS NOTES
.Patient tolerated treatment well, no reaction noted. Patient feels well and has no complaints, vital signs stable. Patient discharged in stable condition with no further needs. Patient returns tomorrow 10/20/2023 for Day 2.

## 2023-10-20 ENCOUNTER — APPOINTMENT (OUTPATIENT)
Dept: HEMATOLOGY/ONCOLOGY | Facility: CLINIC | Age: 83
End: 2023-10-20
Payer: MEDICARE

## 2023-10-20 ENCOUNTER — INFUSION (OUTPATIENT)
Dept: HEMATOLOGY/ONCOLOGY | Facility: CLINIC | Age: 83
End: 2023-10-20
Payer: MEDICARE

## 2023-10-20 VITALS
OXYGEN SATURATION: 96 % | BODY MASS INDEX: 31.08 KG/M2 | HEART RATE: 81 BPM | TEMPERATURE: 97.9 F | HEIGHT: 71 IN | DIASTOLIC BLOOD PRESSURE: 68 MMHG | RESPIRATION RATE: 16 BRPM | WEIGHT: 222 LBS | SYSTOLIC BLOOD PRESSURE: 126 MMHG

## 2023-10-20 DIAGNOSIS — C88.0: ICD-10-CM

## 2023-10-20 PROCEDURE — 96375 TX/PRO/DX INJ NEW DRUG ADDON: CPT | Mod: INF

## 2023-10-20 PROCEDURE — 96372 THER/PROPH/DIAG INJ SC/IM: CPT

## 2023-10-20 PROCEDURE — 96409 CHEMO IV PUSH SNGL DRUG: CPT

## 2023-10-20 PROCEDURE — 2500000004 HC RX 250 GENERAL PHARMACY W/ HCPCS (ALT 636 FOR OP/ED): Mod: JZ | Performed by: INTERNAL MEDICINE

## 2023-10-20 RX ORDER — FAMOTIDINE 10 MG/ML
20 INJECTION INTRAVENOUS ONCE AS NEEDED
Status: DISCONTINUED | OUTPATIENT
Start: 2023-10-20 | End: 2023-10-20 | Stop reason: HOSPADM

## 2023-10-20 RX ORDER — ALBUTEROL SULFATE 0.83 MG/ML
3 SOLUTION RESPIRATORY (INHALATION) AS NEEDED
Status: DISCONTINUED | OUTPATIENT
Start: 2023-10-20 | End: 2023-10-20 | Stop reason: HOSPADM

## 2023-10-20 RX ORDER — PROCHLORPERAZINE MALEATE 10 MG
10 TABLET ORAL EVERY 6 HOURS PRN
Status: DISCONTINUED | OUTPATIENT
Start: 2023-10-20 | End: 2023-10-20 | Stop reason: HOSPADM

## 2023-10-20 RX ORDER — EPINEPHRINE 0.3 MG/.3ML
0.3 INJECTION SUBCUTANEOUS EVERY 5 MIN PRN
Status: DISCONTINUED | OUTPATIENT
Start: 2023-10-20 | End: 2023-10-20 | Stop reason: HOSPADM

## 2023-10-20 RX ORDER — DIPHENHYDRAMINE HYDROCHLORIDE 50 MG/ML
50 INJECTION INTRAMUSCULAR; INTRAVENOUS AS NEEDED
Status: DISCONTINUED | OUTPATIENT
Start: 2023-10-20 | End: 2023-10-20 | Stop reason: HOSPADM

## 2023-10-20 RX ORDER — PROCHLORPERAZINE EDISYLATE 5 MG/ML
10 INJECTION INTRAMUSCULAR; INTRAVENOUS EVERY 6 HOURS PRN
Status: DISCONTINUED | OUTPATIENT
Start: 2023-10-20 | End: 2023-10-20 | Stop reason: HOSPADM

## 2023-10-20 RX ADMIN — DEXAMETHASONE SODIUM PHOSPHATE 12 MG: 4 INJECTION, SOLUTION INTRA-ARTICULAR; INTRALESIONAL; INTRAMUSCULAR; INTRAVENOUS; SOFT TISSUE at 13:52

## 2023-10-20 RX ADMIN — BENDAMUSTINE HYDROCHLORIDE 180 MG: 25 INJECTION, SOLUTION INTRAVENOUS at 14:16

## 2023-10-20 RX ADMIN — PEGFILGRASTIM 6 MG: KIT SUBCUTANEOUS at 14:16

## 2023-10-20 ASSESSMENT — COLUMBIA-SUICIDE SEVERITY RATING SCALE - C-SSRS
2. HAVE YOU ACTUALLY HAD ANY THOUGHTS OF KILLING YOURSELF?: NO
1. IN THE PAST MONTH, HAVE YOU WISHED YOU WERE DEAD OR WISHED YOU COULD GO TO SLEEP AND NOT WAKE UP?: NO
6. HAVE YOU EVER DONE ANYTHING, STARTED TO DO ANYTHING, OR PREPARED TO DO ANYTHING TO END YOUR LIFE?: NO

## 2023-10-20 ASSESSMENT — PATIENT HEALTH QUESTIONNAIRE - PHQ9
2. FEELING DOWN, DEPRESSED OR HOPELESS: NOT AT ALL
1. LITTLE INTEREST OR PLEASURE IN DOING THINGS: NOT AT ALL
SUM OF ALL RESPONSES TO PHQ9 QUESTIONS 1 AND 2: 0

## 2023-10-20 ASSESSMENT — PAIN SCALES - GENERAL: PAINLEVEL: 0-NO PAIN

## 2023-10-20 NOTE — PROGRESS NOTES
Alton is here for cycle five day two of bendeka. He states he is tolerating treatment well, no questions or concerns. Scheduling orders for cycle six ordered. Physical assessment negative, patient has mild fatigue. Patient is accompanied today by his sister Shabnam. Alton has no questions or concerns at this time.

## 2023-10-20 NOTE — SIGNIFICANT EVENT
10/20/23 1255   Prechemo Checklist   Has the patient been in the hospital, ED, or urgent care since last date of service Yes   Chemo/Immuno Consent Signed Yes   Protocol/Indications Verified Yes   Confirmed to previous date/time of medication Yes   Compared to previous dose Yes   All medications are dated accurately Yes   Pregnancy Test Negative Not applicable   Parameters Met Yes   BSA/Weight-Height Verified Yes   Dose Calculations Verified Yes

## 2023-10-24 ENCOUNTER — TELEPHONE (OUTPATIENT)
Dept: ADMISSION | Facility: HOSPITAL | Age: 83
End: 2023-10-24
Payer: MEDICARE

## 2023-11-02 ENCOUNTER — OFFICE VISIT (OUTPATIENT)
Dept: HEMATOLOGY/ONCOLOGY | Facility: HOSPITAL | Age: 83
End: 2023-11-02
Payer: MEDICARE

## 2023-11-02 ENCOUNTER — LAB (OUTPATIENT)
Dept: LAB | Facility: HOSPITAL | Age: 83
End: 2023-11-02
Payer: MEDICARE

## 2023-11-02 VITALS
TEMPERATURE: 97.9 F | DIASTOLIC BLOOD PRESSURE: 62 MMHG | HEART RATE: 66 BPM | SYSTOLIC BLOOD PRESSURE: 149 MMHG | RESPIRATION RATE: 17 BRPM | BODY MASS INDEX: 29.9 KG/M2 | WEIGHT: 216.2 LBS | OXYGEN SATURATION: 100 %

## 2023-11-02 DIAGNOSIS — C88.0: Primary | ICD-10-CM

## 2023-11-02 DIAGNOSIS — C88.0: ICD-10-CM

## 2023-11-02 LAB
ALBUMIN SERPL BCP-MCNC: 4 G/DL (ref 3.4–5)
ALP SERPL-CCNC: 124 U/L (ref 33–136)
ALT SERPL W P-5'-P-CCNC: 13 U/L (ref 10–52)
ANION GAP SERPL CALC-SCNC: 13 MMOL/L (ref 10–20)
AST SERPL W P-5'-P-CCNC: 18 U/L (ref 9–39)
BASOPHILS # BLD AUTO: 0.02 X10*3/UL (ref 0–0.1)
BASOPHILS NFR BLD AUTO: 0.2 %
BILIRUB SERPL-MCNC: 0.4 MG/DL (ref 0–1.2)
BUN SERPL-MCNC: 27 MG/DL (ref 6–23)
CALCIUM SERPL-MCNC: 9.6 MG/DL (ref 8.6–10.6)
CHLORIDE SERPL-SCNC: 103 MMOL/L (ref 98–107)
CO2 SERPL-SCNC: 27 MMOL/L (ref 21–32)
CREAT SERPL-MCNC: 1.23 MG/DL (ref 0.5–1.3)
EOSINOPHIL # BLD AUTO: 0.05 X10*3/UL (ref 0–0.4)
EOSINOPHIL NFR BLD AUTO: 0.5 %
ERYTHROCYTE [DISTWIDTH] IN BLOOD BY AUTOMATED COUNT: 13 % (ref 11.5–14.5)
GFR SERPL CREATININE-BSD FRML MDRD: 59 ML/MIN/1.73M*2
GLUCOSE SERPL-MCNC: 89 MG/DL (ref 74–99)
HCT VFR BLD AUTO: 36.4 % (ref 41–52)
HGB BLD-MCNC: 12.2 G/DL (ref 13.5–17.5)
IGA SERPL-MCNC: <7 MG/DL (ref 70–400)
IGG SERPL-MCNC: 267 MG/DL (ref 700–1600)
IGM SERPL-MCNC: 1130 MG/DL (ref 40–230)
IMM GRANULOCYTES # BLD AUTO: 0.06 X10*3/UL (ref 0–0.5)
IMM GRANULOCYTES NFR BLD AUTO: 0.6 % (ref 0–0.9)
LDH SERPL L TO P-CCNC: 198 U/L (ref 84–246)
LYMPHOCYTES # BLD AUTO: 0.21 X10*3/UL (ref 0.8–3)
LYMPHOCYTES NFR BLD AUTO: 2.1 %
MCH RBC QN AUTO: 34.6 PG (ref 26–34)
MCHC RBC AUTO-ENTMCNC: 33.5 G/DL (ref 32–36)
MCV RBC AUTO: 103 FL (ref 80–100)
MONOCYTES # BLD AUTO: 0.66 X10*3/UL (ref 0.05–0.8)
MONOCYTES NFR BLD AUTO: 6.6 %
NEUTROPHILS # BLD AUTO: 9.01 X10*3/UL (ref 1.6–5.5)
NEUTROPHILS NFR BLD AUTO: 90 %
NRBC BLD-RTO: 0 /100 WBCS (ref 0–0)
PLATELET # BLD AUTO: 152 X10*3/UL (ref 150–450)
POTASSIUM SERPL-SCNC: 4.4 MMOL/L (ref 3.5–5.3)
PROT SERPL-MCNC: 7 G/DL (ref 6.4–8.2)
RBC # BLD AUTO: 3.53 X10*6/UL (ref 4.5–5.9)
SODIUM SERPL-SCNC: 139 MMOL/L (ref 136–145)
WBC # BLD AUTO: 10 X10*3/UL (ref 4.4–11.3)

## 2023-11-02 PROCEDURE — 99215 OFFICE O/P EST HI 40 MIN: CPT | Performed by: INTERNAL MEDICINE

## 2023-11-02 PROCEDURE — 1125F AMNT PAIN NOTED PAIN PRSNT: CPT | Performed by: INTERNAL MEDICINE

## 2023-11-02 PROCEDURE — 85025 COMPLETE CBC W/AUTO DIFF WBC: CPT

## 2023-11-02 PROCEDURE — 82784 ASSAY IGA/IGD/IGG/IGM EACH: CPT

## 2023-11-02 PROCEDURE — 36415 COLL VENOUS BLD VENIPUNCTURE: CPT

## 2023-11-02 PROCEDURE — 1036F TOBACCO NON-USER: CPT | Performed by: INTERNAL MEDICINE

## 2023-11-02 PROCEDURE — 84075 ASSAY ALKALINE PHOSPHATASE: CPT

## 2023-11-02 PROCEDURE — 80053 COMPREHEN METABOLIC PANEL: CPT

## 2023-11-02 PROCEDURE — 83615 LACTATE (LD) (LDH) ENZYME: CPT

## 2023-11-02 PROCEDURE — 82784 ASSAY IGA/IGD/IGG/IGM EACH: CPT | Mod: 59

## 2023-11-02 PROCEDURE — 1159F MED LIST DOCD IN RCRD: CPT | Performed by: INTERNAL MEDICINE

## 2023-11-02 RX ORDER — EPINEPHRINE 0.3 MG/.3ML
0.3 INJECTION SUBCUTANEOUS EVERY 5 MIN PRN
Status: CANCELLED | OUTPATIENT
Start: 2023-11-17

## 2023-11-02 RX ORDER — PROCHLORPERAZINE MALEATE 10 MG
10 TABLET ORAL EVERY 6 HOURS PRN
Status: CANCELLED | OUTPATIENT
Start: 2023-11-17

## 2023-11-02 RX ORDER — PROCHLORPERAZINE MALEATE 10 MG
10 TABLET ORAL EVERY 6 HOURS PRN
Status: CANCELLED | OUTPATIENT
Start: 2023-11-16

## 2023-11-02 RX ORDER — PALONOSETRON 0.05 MG/ML
0.25 INJECTION, SOLUTION INTRAVENOUS ONCE
Status: CANCELLED | OUTPATIENT
Start: 2023-11-16

## 2023-11-02 RX ORDER — DIPHENHYDRAMINE HCL 50 MG
50 CAPSULE ORAL ONCE
Status: CANCELLED | OUTPATIENT
Start: 2023-11-16

## 2023-11-02 RX ORDER — FAMOTIDINE 10 MG/ML
20 INJECTION INTRAVENOUS ONCE AS NEEDED
Status: CANCELLED | OUTPATIENT
Start: 2023-11-17

## 2023-11-02 RX ORDER — ALBUTEROL SULFATE 0.83 MG/ML
3 SOLUTION RESPIRATORY (INHALATION) AS NEEDED
Status: CANCELLED | OUTPATIENT
Start: 2023-11-17

## 2023-11-02 RX ORDER — ALBUTEROL SULFATE 0.83 MG/ML
3 SOLUTION RESPIRATORY (INHALATION) AS NEEDED
Status: CANCELLED | OUTPATIENT
Start: 2023-11-16

## 2023-11-02 RX ORDER — PROCHLORPERAZINE EDISYLATE 5 MG/ML
10 INJECTION INTRAMUSCULAR; INTRAVENOUS EVERY 6 HOURS PRN
Status: CANCELLED | OUTPATIENT
Start: 2023-11-16

## 2023-11-02 RX ORDER — ACETAMINOPHEN 325 MG/1
650 TABLET ORAL ONCE
Status: CANCELLED | OUTPATIENT
Start: 2023-11-16

## 2023-11-02 RX ORDER — DIPHENHYDRAMINE HYDROCHLORIDE 50 MG/ML
50 INJECTION INTRAMUSCULAR; INTRAVENOUS AS NEEDED
Status: CANCELLED | OUTPATIENT
Start: 2023-11-17

## 2023-11-02 RX ORDER — PROCHLORPERAZINE EDISYLATE 5 MG/ML
10 INJECTION INTRAMUSCULAR; INTRAVENOUS EVERY 6 HOURS PRN
Status: CANCELLED | OUTPATIENT
Start: 2023-11-17

## 2023-11-02 RX ORDER — FAMOTIDINE 10 MG/ML
20 INJECTION INTRAVENOUS ONCE AS NEEDED
Status: CANCELLED | OUTPATIENT
Start: 2023-11-16

## 2023-11-02 RX ORDER — DIPHENHYDRAMINE HYDROCHLORIDE 50 MG/ML
50 INJECTION INTRAMUSCULAR; INTRAVENOUS AS NEEDED
Status: CANCELLED | OUTPATIENT
Start: 2023-11-16

## 2023-11-02 RX ORDER — EPINEPHRINE 0.3 MG/.3ML
0.3 INJECTION SUBCUTANEOUS EVERY 5 MIN PRN
Status: CANCELLED | OUTPATIENT
Start: 2023-11-16

## 2023-11-02 RX ORDER — FAMOTIDINE 10 MG/ML
40 INJECTION INTRAVENOUS ONCE
Status: CANCELLED
Start: 2023-11-16 | End: 2023-11-16

## 2023-11-02 ASSESSMENT — ENCOUNTER SYMPTOMS
LOSS OF SENSATION IN FEET: 1
DEPRESSION: 0
OCCASIONAL FEELINGS OF UNSTEADINESS: 0

## 2023-11-02 ASSESSMENT — PAIN SCALES - GENERAL: PAINLEVEL: 3

## 2023-11-15 NOTE — PROGRESS NOTES
Patient ID: Carlton Rose is a 82 y.o. male.  Referring Physician: Manny Lopez MD PhD  [address] [place] [address]  Primary Care Provider: Willie Viera MD  Visit Type: Follow Up      Subjective    For LPL MZL in 2013  rituximab, chlorambucil, bendamustine. reported to have severe reactions to rituximab, managed with heavy premedication.     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 He was previously treated with plasmapheresis followed by rituximab, but had severe infusion reaction. He then switched a different premedication regimen, with dexamethasone that was started 1 day before the infusion. Other regimens were described above. He tolerated the treatment well. He has not been treated for the past few years.     Upon evaluating in June 2023, his presentation was consistent with WM in relapse. IgM was 3770 mg/dL. It was decided to started R-andrea. With his history of reaction, first dose ritux was given as an inpt and well tolerated. So far he completed 2 cycles of R-andrea, with C1 andrea at 60 and 70mg/m2, and C2-3 andrea dose at 90 mg/m2.     More recently  C5 BR (at  [place]) with andrea 81 mg/m2. Tolerating therapy well with mild fatigue. Able to complete daily activities without limitation.   No fever, chills or night sweats. No lymphadenopathy or masses. He was unhappy with the IV sites used for chemo.             Objective   BSA: 2.22 meters squared  /62   Pulse 66   Temp 36.6 °C (97.9 °F) (Skin)   Resp 17   Wt 98.1  kg (216 lb 3.2 oz)   SpO2 100%   BMI 29.90 kg/m²      has a past medical history of Aneurysm of other specified arteries (CMS/HCC), Decreased white blood cell count, unspecified, Familial hypercholesterolemia, Nonfamilial hypogammaglobulinemia (CMS/HCC), Other conditions influencing health status, Other conditions influencing health status, Personal history of diseases of the blood and blood-forming organs and certain disorders involving the immune mechanism, and Personal history of diseases of the blood and blood-forming organs and certain disorders involving the immune mechanism.   has a past surgical history that includes Cataract extraction (09/05/2013) and Other surgical history (12/04/2020).  Family History   Problem Relation Name Age of Onset    Alzheimer's disease Mother      Dementia Mother      Cataracts Mother      Heart failure Father      Cataracts Father      Hypertension Brother      Peripheral vascular disease Brother       Oncology History   Waldenstrom's macroglobulinemia with kappa light chains (CMS/HCC)   7/27/2023 -  Chemotherapy    Bendamustine + RiTUXimab, 28 Day Cycles     10/4/2023 Initial Diagnosis    Waldenstrom's macroglobulinemia with kappa light chains (CMS/HCC)         Carlton Rose  reports that he has never smoked. He has been exposed to tobacco smoke. He has never used smokeless tobacco.  He  reports no history of alcohol use.  He  reports no history of drug use.    EXAM: 2 small red nodules in the right hand, next to the IV site.   No LAD.     Physical Exam  Vitals reviewed.   Constitutional:       Appearance: Normal appearance.   HENT:      Head: Normocephalic and atraumatic.      Nose: Nose normal.      Mouth/Throat:      Mouth: Mucous membranes are moist.      Pharynx: Oropharynx is clear.   Eyes:      Extraocular Movements: Extraocular movements intact.      Conjunctiva/sclera: Conjunctivae normal.      Pupils: Pupils are equal, round, and reactive to light.   Cardiovascular:       Rate and Rhythm: Normal rate and regular rhythm.      Pulses: Normal pulses.      Heart sounds: Normal heart sounds.   Pulmonary:      Effort: Pulmonary effort is normal.      Breath sounds: Normal breath sounds.   Abdominal:      General: Bowel sounds are normal.      Palpations: Abdomen is soft.   Musculoskeletal:         General: Normal range of motion.      Cervical back: Normal range of motion.   Skin:     General: Skin is warm and dry.   Neurological:      General: No focal deficit present.      Mental Status: He is alert and oriented to person, place, and time.   Psychiatric:         Mood and Affect: Mood normal.         Behavior: Behavior normal.         Thought Content: Thought content normal.         Judgment: Judgment normal.         WBC   Date/Time Value Ref Range Status   11/02/2023 05:21 PM 10.0 4.4 - 11.3 x10*3/uL Final   10/19/2023 09:48 AM 5.2 4.4 - 11.3 x10*3/uL Final   09/21/2023 09:40 AM 3.7 (L) 4.4 - 11.3 x10E9/L Final   09/07/2023 11:00 AM 7.0 4.4 - 11.3 x10E9/L Final   08/24/2023 09:20 AM 5.6 4.4 - 11.3 x10E9/L Final     nRBC   Date Value Ref Range Status   11/02/2023 0.0 0.0 - 0.0 /100 WBCs Final   06/26/2023 0.0 0.0 - 0.0 /100 WBC Final   06/25/2023 0.0 0.0 - 0.0 /100 WBC Final   06/24/2023 0.0 0.0 - 0.0 /100 WBC Final     RBC   Date Value Ref Range Status   11/02/2023 3.53 (L) 4.50 - 5.90 x10*6/uL Final   10/19/2023 3.63 (L) 4.50 - 5.90 x10*6/uL Final   09/21/2023 3.47 (L) 4.50 - 5.90 x10E12/L Final   09/07/2023 3.62 (L) 4.50 - 5.90 x10E12/L Final   08/24/2023 3.71 (L) 4.50 - 5.90 x10E12/L Final     Hemoglobin   Date Value Ref Range Status   11/02/2023 12.2 (L) 13.5 - 17.5 g/dL Final   10/19/2023 12.2 (L) 13.5 - 17.5 g/dL Final   09/21/2023 11.7 (L) 13.5 - 17.5 g/dL Final   09/07/2023 12.1 (L) 13.5 - 17.5 g/dL Final   08/24/2023 12.0 (L) 13.5 - 17.5 g/dL Final     Hematocrit   Date Value Ref Range Status   11/02/2023 36.4 (L) 41.0 - 52.0 % Final   10/19/2023 35.8 (L) 41.0 - 52.0 %  Final   09/21/2023 34.0 (L) 41.0 - 52.0 % Final   09/07/2023 36.8 (L) 41.0 - 52.0 % Final   08/24/2023 35.8 (L) 41.0 - 52.0 % Final     MCV   Date/Time Value Ref Range Status   11/02/2023 05:21  (H) 80 - 100 fL Final   10/19/2023 09:48 AM 99 80 - 100 fL Final   09/21/2023 09:40 AM 98 80 - 100 fL Final   09/07/2023 11:00  (H) 80 - 100 fL Final   08/24/2023 09:20 AM 96 80 - 100 fL Final     MCH   Date/Time Value Ref Range Status   11/02/2023 05:21 PM 34.6 (H) 26.0 - 34.0 pg Final   10/19/2023 09:48 AM 33.6 26.0 - 34.0 pg Final     MCHC   Date/Time Value Ref Range Status   11/02/2023 05:21 PM 33.5 32.0 - 36.0 g/dL Final   10/19/2023 09:48 AM 34.1 32.0 - 36.0 g/dL Final   09/21/2023 09:40 AM 34.4 32.0 - 36.0 g/dL Final   09/07/2023 11:00 AM 32.9 32.0 - 36.0 g/dL Final   08/24/2023 09:20 AM 33.5 32.0 - 36.0 g/dL Final     RDW   Date/Time Value Ref Range Status   11/02/2023 05:21 PM 13.0 11.5 - 14.5 % Final   10/19/2023 09:48 AM 12.9 11.5 - 14.5 % Final   09/21/2023 09:40 AM 13.5 11.5 - 14.5 % Final   09/07/2023 11:00 AM 14.1 11.5 - 14.5 % Final   08/24/2023 09:20 AM 13.6 11.5 - 14.5 % Final     Platelets   Date/Time Value Ref Range Status   11/02/2023 05:21  150 - 450 x10*3/uL Final   10/19/2023 09:48  150 - 450 x10*3/uL Final   09/21/2023 09:40  150 - 450 x10E9/L Final   09/07/2023 11:00  150 - 450 x10E9/L Final   08/24/2023 09:20  (L) 150 - 450 x10E9/L Final     MPV   Date/Time Value Ref Range Status   10/19/2023 09:48 AM 9.2 7.5 - 11.5 fL Final     Neutrophils %   Date/Time Value Ref Range Status   11/02/2023 05:21 PM 90.0 40.0 - 80.0 % Final   10/19/2023 09:48 AM 96.1 40.0 - 80.0 % Final   09/21/2023 09:40 AM 94.5 40.0 - 80.0 % Final   09/07/2023 11:00 AM 88.3 40.0 - 80.0 % Final   08/24/2023 09:20 AM 97.7 40.0 - 80.0 % Final     Immature Granulocytes %, Automated   Date/Time Value Ref Range Status   11/02/2023 05:21 PM 0.6 0.0 - 0.9 % Final     Comment:     Immature  Granulocyte Count (IG) includes promyelocytes, myelocytes and metamyelocytes but does not include bands. Percent differential counts (%) should be interpreted in the context of the absolute cell counts (cells/UL).   10/19/2023 09:48 AM 0.2 0.0 - 0.9 % Final     Comment:     Immature Granulocyte Count (IG) includes promyelocytes, myelocytes and metamyelocytes but does not include bands. Percent differential counts (%) should be interpreted in the context of the absolute cell counts (cells/UL).   09/21/2023 09:40 AM 0.3 0.0 - 0.9 % Final     Comment:      Immature Granulocyte Count (IG) includes promyelocytes,    myelocytes and metamyelocytes but does not include bands.   Percent differential counts (%) should be interpreted in the   context of the absolute cell counts (cells/L).     09/07/2023 11:00 AM 0.6 0.0 - 0.9 % Final     Comment:      Immature Granulocyte Count (IG) includes promyelocytes,    myelocytes and metamyelocytes but does not include bands.   Percent differential counts (%) should be interpreted in the   context of the absolute cell counts (cells/L).     08/24/2023 09:20 AM 0.2 0.0 - 0.9 % Final     Comment:      Immature Granulocyte Count (IG) includes promyelocytes,    myelocytes and metamyelocytes but does not include bands.   Percent differential counts (%) should be interpreted in the   context of the absolute cell counts (cells/L).       Lymphocytes %   Date/Time Value Ref Range Status   11/02/2023 05:21 PM 2.1 13.0 - 44.0 % Final   10/19/2023 09:48 AM 2.7 13.0 - 44.0 % Final   09/21/2023 09:40 AM 3.8 13.0 - 44.0 % Final   09/07/2023 11:00 AM 2.3 13.0 - 44.0 % Final   08/24/2023 09:20 AM 1.4 13.0 - 44.0 % Final     Monocytes %   Date/Time Value Ref Range Status   11/02/2023 05:21 PM 6.6 2.0 - 10.0 % Final   10/19/2023 09:48 AM 1.0 2.0 - 10.0 % Final   09/21/2023 09:40 AM 1.1 2.0 - 10.0 % Final   09/07/2023 11:00 AM 7.8 2.0 - 10.0 % Final   08/24/2023 09:20 AM 0.7 2.0 - 10.0 % Final      Eosinophils %   Date/Time Value Ref Range Status   11/02/2023 05:21 PM 0.5 0.0 - 6.0 % Final   10/19/2023 09:48 AM 0.0 0.0 - 6.0 % Final   09/07/2023 11:00 AM 0.6 0.0 - 6.0 % Final   08/10/2023 03:02 PM 0.5 0.0 - 6.0 % Final   07/13/2023 01:47 PM 0.5 0.0 - 6.0 % Final     Basophils %   Date/Time Value Ref Range Status   11/02/2023 05:21 PM 0.2 0.0 - 2.0 % Final   10/19/2023 09:48 AM 0.0 0.0 - 2.0 % Final   09/21/2023 09:40 AM 0.3 0.0 - 2.0 % Final   09/07/2023 11:00 AM 0.4 0.0 - 2.0 % Final   08/10/2023 03:02 PM 0.3 0.0 - 2.0 % Final     Neutrophils Absolute   Date/Time Value Ref Range Status   11/02/2023 05:21 PM 9.01 (H) 1.60 - 5.50 x10*3/uL Final     Comment:     Percent differential counts (%) should be interpreted in the context of the absolute cell counts (cells/uL).   10/19/2023 09:48 AM 4.99 1.60 - 5.50 x10*3/uL Final     Comment:     Percent differential counts (%) should be interpreted in the context of the absolute cell counts (cells/uL).   09/21/2023 09:40 AM 3.46 1.60 - 5.50 x10E9/L Final   09/07/2023 11:00 AM 6.19 (H) 1.60 - 5.50 x10E9/L Final   08/24/2023 09:20 AM 5.49 1.60 - 5.50 x10E9/L Final     Immature Granulocytes Absolute, Automated   Date/Time Value Ref Range Status   11/02/2023 05:21 PM 0.06 0.00 - 0.50 x10*3/uL Final   10/19/2023 09:48 AM 0.01 0.00 - 0.50 x10*3/uL Final     Lymphocytes Absolute   Date/Time Value Ref Range Status   11/02/2023 05:21 PM 0.21 (L) 0.80 - 3.00 x10*3/uL Final   10/19/2023 09:48 AM 0.14 (L) 0.80 - 3.00 x10*3/uL Final   09/21/2023 09:40 AM 0.14 (L) 0.80 - 3.00 x10E9/L Final   09/07/2023 11:00 AM 0.16 (L) 0.80 - 3.00 x10E9/L Final   08/24/2023 09:20 AM 0.08 (L) 0.80 - 3.00 x10E9/L Final     Monocytes Absolute   Date/Time Value Ref Range Status   11/02/2023 05:21 PM 0.66 0.05 - 0.80 x10*3/uL Final   10/19/2023 09:48 AM 0.05 0.05 - 0.80 x10*3/uL Final   09/21/2023 09:40 AM 0.04 (L) 0.05 - 0.80 x10E9/L Final   09/07/2023 11:00 AM 0.55 0.05 - 0.80 x10E9/L Final  "  08/24/2023 09:20 AM 0.04 (L) 0.05 - 0.80 x10E9/L Final     Eosinophils Absolute   Date/Time Value Ref Range Status   11/02/2023 05:21 PM 0.05 0.00 - 0.40 x10*3/uL Final   10/19/2023 09:48 AM 0.00 0.00 - 0.40 x10*3/uL Final   09/07/2023 11:00 AM 0.04 0.00 - 0.40 x10E9/L Final   08/10/2023 03:02 PM 0.04 0.00 - 0.40 x10E9/L Final   07/13/2023 01:47 PM 0.04 0.00 - 0.40 x10E9/L Final     Basophils Absolute   Date/Time Value Ref Range Status   11/02/2023 05:21 PM 0.02 0.00 - 0.10 x10*3/uL Final   10/19/2023 09:48 AM 0.00 0.00 - 0.10 x10*3/uL Final   09/21/2023 09:40 AM 0.01 0.00 - 0.10 x10E9/L Final   09/07/2023 11:00 AM 0.03 0.00 - 0.10 x10E9/L Final   08/10/2023 03:02 PM 0.02 0.00 - 0.10 x10E9/L Final       No components found for: \"PT\"  aPTT   Date/Time Value Ref Range Status   06/24/2023 10:18 PM 28 27 - 38 sec Final     Comment:     Note new reference range as of 6/20/2023 at 10:00am.   03/07/2023 11:33 AM 35 26 - 39 sec Final     Comment:       THE APTT IS NO LONGER USED FOR MONITORING     UNFRACTIONATED HEPARIN THERAPY.    FOR MONITORING HEPARIN THERAPY,     USE THE HEPARIN ASSAY.         Assessment/Plan    Waldenstrom's macroglobulinemia with kappa light chains (CMS/HCC) - Primary C88.0        #WM  -Based on the BMBx in 2020, the MYD88 mutation, and the increase in IgM and viscosity, the diagnosis is consistent with WM.   -IgM has been steadily increasing, from the low of 1070 in Jan 2021 to 3770 mg/dL in June 2023.   -In addition, both hgb and PLT are mildly decreased.   -Given his age, this may be an ideal time to intervene, with the goal to reduce disease burden, prevent disease related complications.   -Treatment tolerated well, and response is satisfactory, c/w NE.      #Treatment  -Discussed BTKi, vs rituximab, and R-Anthony.   -patient is more interested in a treatment with a finite duration. Therefore discussed thoroughly the AEs of rituximab, bendamustine, and the drugs in combination. In particular, " risks of severe/fatal infections were discussed. PML was also discussed. Severe and protracted cytoepenia or aplasia were also discussed. Patient consented.   -Appropriate testing for HBV and other prophylaxis will be taken, including G-CSF, Bactrim for PJP, and acyclovir.   -Given severe reaction to ritux, he will receive 1st dose of ritux as an inpatient. Subsequently he will receive R-Anthony as an outpatient.   -The above approach proved safe. He tolerated 4 cycles of R-anthony.   Plan:    C6 Rbenda scheduled on 11/16/2023.    Prophylaxis: acyclovir, gcsf, bactrim.   -RTC 12/14/2023.         Time spent: 45min, >50% on counseling and care coordination.        Problem List Items Addressed This Visit             ICD-10-CM    Waldenstrom's macroglobulinemia with kappa light chains (CMS/HCC) - Primary C88.0    Relevant Orders    CBC and Auto Differential    Comprehensive Metabolic Panel    Lactate Dehydrogenase    Immunoglobulins (IgG, IgA, IgM)    Clinic Appointment Request    Infusion Appointment Request    CBC and Auto Differential    Comprehensive metabolic panel    Lactate dehydrogenase    Uric Acid    Infusion Appointment Request    Clinic Appointment Request Follow Up; DONNELL BUSTILLO MD PhD

## 2023-11-16 ENCOUNTER — INFUSION (OUTPATIENT)
Dept: HEMATOLOGY/ONCOLOGY | Facility: CLINIC | Age: 83
End: 2023-11-16
Payer: MEDICARE

## 2023-11-16 ENCOUNTER — SOCIAL WORK (OUTPATIENT)
Dept: CASE MANAGEMENT | Facility: HOSPITAL | Age: 83
End: 2023-11-16
Payer: MEDICARE

## 2023-11-16 VITALS
OXYGEN SATURATION: 94 % | WEIGHT: 217 LBS | HEIGHT: 71 IN | BODY MASS INDEX: 30.38 KG/M2 | TEMPERATURE: 97.9 F | RESPIRATION RATE: 16 BRPM | HEART RATE: 82 BPM | DIASTOLIC BLOOD PRESSURE: 71 MMHG | SYSTOLIC BLOOD PRESSURE: 116 MMHG

## 2023-11-16 DIAGNOSIS — C88.0: ICD-10-CM

## 2023-11-16 LAB
ALBUMIN SERPL BCP-MCNC: 4 G/DL (ref 3.4–5)
ALP SERPL-CCNC: 84 U/L (ref 33–136)
ALT SERPL W P-5'-P-CCNC: 11 U/L (ref 10–52)
ANION GAP SERPL CALC-SCNC: 16 MMOL/L (ref 10–20)
AST SERPL W P-5'-P-CCNC: 17 U/L (ref 9–39)
BASOPHILS # BLD AUTO: 0.01 X10*3/UL (ref 0–0.1)
BASOPHILS NFR BLD AUTO: 0.4 %
BILIRUB SERPL-MCNC: 0.5 MG/DL (ref 0–1.2)
BUN SERPL-MCNC: 26 MG/DL (ref 6–23)
CALCIUM SERPL-MCNC: 9.4 MG/DL (ref 8.6–10.3)
CHLORIDE SERPL-SCNC: 103 MMOL/L (ref 98–107)
CO2 SERPL-SCNC: 23 MMOL/L (ref 21–32)
CREAT SERPL-MCNC: 1.17 MG/DL (ref 0.5–1.3)
EOSINOPHIL # BLD AUTO: 0 X10*3/UL (ref 0–0.4)
EOSINOPHIL NFR BLD AUTO: 0 %
ERYTHROCYTE [DISTWIDTH] IN BLOOD BY AUTOMATED COUNT: 12.8 % (ref 11.5–14.5)
GFR SERPL CREATININE-BSD FRML MDRD: 63 ML/MIN/1.73M*2
GLUCOSE SERPL-MCNC: 161 MG/DL (ref 74–99)
HCT VFR BLD AUTO: 36 % (ref 41–52)
HGB BLD-MCNC: 12.2 G/DL (ref 13.5–17.5)
IMM GRANULOCYTES # BLD AUTO: 0.01 X10*3/UL (ref 0–0.5)
IMM GRANULOCYTES NFR BLD AUTO: 0.4 % (ref 0–0.9)
LDH SERPL L TO P-CCNC: 189 U/L (ref 84–246)
LYMPHOCYTES # BLD AUTO: 0.09 X10*3/UL (ref 0.8–3)
LYMPHOCYTES NFR BLD AUTO: 3.5 %
MCH RBC QN AUTO: 33.6 PG (ref 26–34)
MCHC RBC AUTO-ENTMCNC: 33.9 G/DL (ref 32–36)
MCV RBC AUTO: 99 FL (ref 80–100)
MONOCYTES # BLD AUTO: 0.03 X10*3/UL (ref 0.05–0.8)
MONOCYTES NFR BLD AUTO: 1.2 %
NEUTROPHILS # BLD AUTO: 2.4 X10*3/UL (ref 1.6–5.5)
NEUTROPHILS NFR BLD AUTO: 94.5 %
PLATELET # BLD AUTO: 153 X10*3/UL (ref 150–450)
POTASSIUM SERPL-SCNC: 4.6 MMOL/L (ref 3.5–5.3)
PROT SERPL-MCNC: 6.5 G/DL (ref 6.4–8.2)
RBC # BLD AUTO: 3.63 X10*6/UL (ref 4.5–5.9)
SODIUM SERPL-SCNC: 137 MMOL/L (ref 136–145)
URATE SERPL-MCNC: 5.9 MG/DL (ref 4–7.5)
WBC # BLD AUTO: 2.5 X10*3/UL (ref 4.4–11.3)

## 2023-11-16 PROCEDURE — 80053 COMPREHEN METABOLIC PANEL: CPT

## 2023-11-16 PROCEDURE — 2500000004 HC RX 250 GENERAL PHARMACY W/ HCPCS (ALT 636 FOR OP/ED): Performed by: INTERNAL MEDICINE

## 2023-11-16 PROCEDURE — 36415 COLL VENOUS BLD VENIPUNCTURE: CPT

## 2023-11-16 PROCEDURE — 85025 COMPLETE CBC W/AUTO DIFF WBC: CPT

## 2023-11-16 PROCEDURE — 96375 TX/PRO/DX INJ NEW DRUG ADDON: CPT | Mod: INF

## 2023-11-16 PROCEDURE — 84550 ASSAY OF BLOOD/URIC ACID: CPT

## 2023-11-16 PROCEDURE — 96415 CHEMO IV INFUSION ADDL HR: CPT

## 2023-11-16 PROCEDURE — 83615 LACTATE (LD) (LDH) ENZYME: CPT

## 2023-11-16 PROCEDURE — 96413 CHEMO IV INFUSION 1 HR: CPT

## 2023-11-16 PROCEDURE — 2500000001 HC RX 250 WO HCPCS SELF ADMINISTERED DRUGS (ALT 637 FOR MEDICARE OP): Performed by: INTERNAL MEDICINE

## 2023-11-16 PROCEDURE — 96411 CHEMO IV PUSH ADDL DRUG: CPT

## 2023-11-16 RX ORDER — FAMOTIDINE 10 MG/ML
40 INJECTION INTRAVENOUS ONCE
Status: COMPLETED | OUTPATIENT
Start: 2023-11-16 | End: 2023-11-16

## 2023-11-16 RX ORDER — PROCHLORPERAZINE EDISYLATE 5 MG/ML
10 INJECTION INTRAMUSCULAR; INTRAVENOUS EVERY 6 HOURS PRN
Status: DISCONTINUED | OUTPATIENT
Start: 2023-11-16 | End: 2023-11-16 | Stop reason: HOSPADM

## 2023-11-16 RX ORDER — PROCHLORPERAZINE MALEATE 10 MG
10 TABLET ORAL EVERY 6 HOURS PRN
Status: DISCONTINUED | OUTPATIENT
Start: 2023-11-16 | End: 2023-11-16 | Stop reason: HOSPADM

## 2023-11-16 RX ORDER — DIPHENHYDRAMINE HCL 25 MG
50 CAPSULE ORAL ONCE
Status: COMPLETED | OUTPATIENT
Start: 2023-11-16 | End: 2023-11-16

## 2023-11-16 RX ORDER — FAMOTIDINE 10 MG/ML
20 INJECTION INTRAVENOUS ONCE AS NEEDED
Status: DISCONTINUED | OUTPATIENT
Start: 2023-11-16 | End: 2023-11-16 | Stop reason: HOSPADM

## 2023-11-16 RX ORDER — PALONOSETRON 0.05 MG/ML
0.25 INJECTION, SOLUTION INTRAVENOUS ONCE
Status: COMPLETED | OUTPATIENT
Start: 2023-11-16 | End: 2023-11-16

## 2023-11-16 RX ORDER — ALBUTEROL SULFATE 0.83 MG/ML
3 SOLUTION RESPIRATORY (INHALATION) AS NEEDED
Status: DISCONTINUED | OUTPATIENT
Start: 2023-11-16 | End: 2023-11-16 | Stop reason: HOSPADM

## 2023-11-16 RX ORDER — EPINEPHRINE 0.3 MG/.3ML
0.3 INJECTION SUBCUTANEOUS EVERY 5 MIN PRN
Status: DISCONTINUED | OUTPATIENT
Start: 2023-11-16 | End: 2023-11-16 | Stop reason: HOSPADM

## 2023-11-16 RX ORDER — ACETAMINOPHEN 325 MG/1
650 TABLET ORAL ONCE
Status: COMPLETED | OUTPATIENT
Start: 2023-11-16 | End: 2023-11-16

## 2023-11-16 RX ORDER — DIPHENHYDRAMINE HYDROCHLORIDE 50 MG/ML
50 INJECTION INTRAMUSCULAR; INTRAVENOUS AS NEEDED
Status: DISCONTINUED | OUTPATIENT
Start: 2023-11-16 | End: 2023-11-16 | Stop reason: HOSPADM

## 2023-11-16 RX ADMIN — PALONOSETRON 250 MCG: 0.05 INJECTION, SOLUTION INTRAVENOUS at 10:02

## 2023-11-16 RX ADMIN — RITUXIMAB 800 MG: 10 INJECTION, SOLUTION INTRAVENOUS at 11:38

## 2023-11-16 RX ADMIN — METHYLPREDNISOLONE SODIUM SUCCINATE 20 MG: 40 INJECTION, POWDER, FOR SOLUTION INTRAMUSCULAR; INTRAVENOUS at 10:05

## 2023-11-16 RX ADMIN — FAMOTIDINE 40 MG: 10 INJECTION INTRAVENOUS at 10:04

## 2023-11-16 RX ADMIN — BENDAMUSTINE HYDROCHLORIDE 180 MG: 25 INJECTION, SOLUTION INTRAVENOUS at 11:14

## 2023-11-16 RX ADMIN — DIPHENHYDRAMINE HYDROCHLORIDE 50 MG: 25 CAPSULE ORAL at 10:01

## 2023-11-16 RX ADMIN — DEXAMETHASONE SODIUM PHOSPHATE 12 MG: 4 INJECTION, SOLUTION INTRA-ARTICULAR; INTRALESIONAL; INTRAMUSCULAR; INTRAVENOUS; SOFT TISSUE at 10:54

## 2023-11-16 RX ADMIN — ACETAMINOPHEN 650 MG: 325 TABLET ORAL at 10:01

## 2023-11-16 RX ADMIN — SODIUM CHLORIDE 500 ML: 9 INJECTION, SOLUTION INTRAVENOUS at 11:20

## 2023-11-16 ASSESSMENT — PAIN SCALES - GENERAL: PAINLEVEL: 2

## 2023-11-16 NOTE — PROGRESS NOTES
Mr. Rose is here for cycle 6 day 1 of Rituximab/Bendeka. He states he is tolerating treatment and is excited this is his last one. He will have a follow up visit with Dr. Lopez 12/14.

## 2023-11-16 NOTE — SIGNIFICANT EVENT
11/16/23 0941   Prechemo Checklist   Has the patient been in the hospital, ED, or urgent care since last date of service No   Chemo/Immuno Consent Signed Yes   Protocol/Indications Verified Yes   Confirmed to previous date/time of medication Yes   Compared to previous dose Yes   All medications are dated accurately Yes   Pregnancy Test Negative Not applicable   Parameters Met Yes   BSA/Weight-Height Verified Yes   Dose Calculations Verified Yes

## 2023-11-16 NOTE — PROGRESS NOTES
SW met with patient and his step-son today to assess needs and offer support.  Patient was A&Ox3 with appropriate and congruent mood and affect.  SW asked patient how he was doing.  Patient stated that his daughter passed away this morning from a brain aneurysm.  Patient talked about what happened and processed how he was doing.  SW offered support and active listening. Patient was thankful for his time with her.  SW asked patient if he needed anything and patient stated that he could not think of anything at this time.  SW will continue to follow patient.      Andrew GARCÍA, ALEXIS      SW spoke with Vanda.  Patient is approved for Videostir true and he will have to notify her once he starts receiving bills.  An application was also mailed out for patient to complete.  SW provided this information to patient.  Patient stated that he was waiting for his social security letter and then he will mail the application.    Andrew GARCÍA, ALEXIS

## 2023-11-17 ENCOUNTER — INFUSION (OUTPATIENT)
Dept: HEMATOLOGY/ONCOLOGY | Facility: CLINIC | Age: 83
End: 2023-11-17
Payer: MEDICARE

## 2023-11-17 VITALS
TEMPERATURE: 97.9 F | HEIGHT: 71 IN | HEART RATE: 74 BPM | OXYGEN SATURATION: 94 % | WEIGHT: 223.33 LBS | BODY MASS INDEX: 31.27 KG/M2 | RESPIRATION RATE: 16 BRPM | SYSTOLIC BLOOD PRESSURE: 135 MMHG | DIASTOLIC BLOOD PRESSURE: 67 MMHG

## 2023-11-17 DIAGNOSIS — C88.0: ICD-10-CM

## 2023-11-17 PROCEDURE — 96361 HYDRATE IV INFUSION ADD-ON: CPT | Mod: INF

## 2023-11-17 PROCEDURE — 96372 THER/PROPH/DIAG INJ SC/IM: CPT

## 2023-11-17 PROCEDURE — 2500000004 HC RX 250 GENERAL PHARMACY W/ HCPCS (ALT 636 FOR OP/ED): Mod: JG | Performed by: INTERNAL MEDICINE

## 2023-11-17 PROCEDURE — 2500000004 HC RX 250 GENERAL PHARMACY W/ HCPCS (ALT 636 FOR OP/ED): Performed by: INTERNAL MEDICINE

## 2023-11-17 PROCEDURE — 96409 CHEMO IV PUSH SNGL DRUG: CPT

## 2023-11-17 PROCEDURE — 96375 TX/PRO/DX INJ NEW DRUG ADDON: CPT | Mod: INF

## 2023-11-17 RX ORDER — DIPHENHYDRAMINE HYDROCHLORIDE 50 MG/ML
50 INJECTION INTRAMUSCULAR; INTRAVENOUS AS NEEDED
Status: DISCONTINUED | OUTPATIENT
Start: 2023-11-17 | End: 2023-11-17 | Stop reason: HOSPADM

## 2023-11-17 RX ORDER — FAMOTIDINE 10 MG/ML
20 INJECTION INTRAVENOUS ONCE AS NEEDED
Status: DISCONTINUED | OUTPATIENT
Start: 2023-11-17 | End: 2023-11-17 | Stop reason: HOSPADM

## 2023-11-17 RX ORDER — PROCHLORPERAZINE MALEATE 10 MG
10 TABLET ORAL EVERY 6 HOURS PRN
Status: DISCONTINUED | OUTPATIENT
Start: 2023-11-17 | End: 2023-11-17 | Stop reason: HOSPADM

## 2023-11-17 RX ORDER — EPINEPHRINE 0.3 MG/.3ML
0.3 INJECTION SUBCUTANEOUS EVERY 5 MIN PRN
Status: DISCONTINUED | OUTPATIENT
Start: 2023-11-17 | End: 2023-11-17 | Stop reason: HOSPADM

## 2023-11-17 RX ORDER — PROCHLORPERAZINE EDISYLATE 5 MG/ML
10 INJECTION INTRAMUSCULAR; INTRAVENOUS EVERY 6 HOURS PRN
Status: DISCONTINUED | OUTPATIENT
Start: 2023-11-17 | End: 2023-11-17 | Stop reason: HOSPADM

## 2023-11-17 RX ORDER — ALBUTEROL SULFATE 0.83 MG/ML
3 SOLUTION RESPIRATORY (INHALATION) AS NEEDED
Status: DISCONTINUED | OUTPATIENT
Start: 2023-11-17 | End: 2023-11-17 | Stop reason: HOSPADM

## 2023-11-17 RX ADMIN — BENDAMUSTINE HYDROCHLORIDE 180 MG: 25 INJECTION, SOLUTION INTRAVENOUS at 10:11

## 2023-11-17 RX ADMIN — PEGFILGRASTIM 6 MG: KIT SUBCUTANEOUS at 10:12

## 2023-11-17 RX ADMIN — SODIUM CHLORIDE 500 ML: 9 INJECTION, SOLUTION INTRAVENOUS at 10:28

## 2023-11-17 RX ADMIN — DEXAMETHASONE SODIUM PHOSPHATE 12 MG: 4 INJECTION, SOLUTION INTRA-ARTICULAR; INTRALESIONAL; INTRAMUSCULAR; INTRAVENOUS; SOFT TISSUE at 09:47

## 2023-11-17 NOTE — PROGRESS NOTES
Patient is here for c6d2 of Bendeka and onpro. Patient tolerating treatment. He has a follow up with Dr. Lopez scheduled and to have labs drawn before appointment. No questions or concerns at this time.

## 2023-11-17 NOTE — SIGNIFICANT EVENT
11/17/23 0848   Prechemo Checklist   Has the patient been in the hospital, ED, or urgent care since last date of service No   Chemo/Immuno Consent Signed Yes   Protocol/Indications Verified Yes   Confirmed to previous date/time of medication Yes   Compared to previous dose Yes   All medications are dated accurately Yes   Pregnancy Test Negative Not applicable   Parameters Met Yes   BSA/Weight-Height Verified Yes   Dose Calculations Verified Yes

## 2023-12-05 ENCOUNTER — LAB (OUTPATIENT)
Dept: LAB | Facility: HOSPITAL | Age: 83
End: 2023-12-05
Payer: MEDICARE

## 2023-12-05 DIAGNOSIS — C88.0: ICD-10-CM

## 2023-12-05 LAB
ALBUMIN SERPL BCP-MCNC: 3.7 G/DL (ref 3.4–5)
ALP SERPL-CCNC: 66 U/L (ref 33–136)
ALT SERPL W P-5'-P-CCNC: 10 U/L (ref 10–52)
ANION GAP SERPL CALC-SCNC: 11 MMOL/L (ref 10–20)
AST SERPL W P-5'-P-CCNC: 16 U/L (ref 9–39)
BASOPHILS # BLD AUTO: 0.02 X10*3/UL (ref 0–0.1)
BASOPHILS NFR BLD AUTO: 0.7 %
BILIRUB SERPL-MCNC: 0.4 MG/DL (ref 0–1.2)
BUN SERPL-MCNC: 19 MG/DL (ref 6–23)
CALCIUM SERPL-MCNC: 8.8 MG/DL (ref 8.6–10.3)
CHLORIDE SERPL-SCNC: 104 MMOL/L (ref 98–107)
CO2 SERPL-SCNC: 27 MMOL/L (ref 21–32)
CREAT SERPL-MCNC: 1.15 MG/DL (ref 0.5–1.3)
EOSINOPHIL # BLD AUTO: 0.03 X10*3/UL (ref 0–0.4)
EOSINOPHIL NFR BLD AUTO: 1.1 %
ERYTHROCYTE [DISTWIDTH] IN BLOOD BY AUTOMATED COUNT: 12.4 % (ref 11.5–14.5)
GFR SERPL CREATININE-BSD FRML MDRD: 64 ML/MIN/1.73M*2
GLUCOSE SERPL-MCNC: 87 MG/DL (ref 74–99)
HCT VFR BLD AUTO: 35.1 % (ref 41–52)
HGB BLD-MCNC: 11.8 G/DL (ref 13.5–17.5)
IMM GRANULOCYTES # BLD AUTO: 0 X10*3/UL (ref 0–0.5)
IMM GRANULOCYTES NFR BLD AUTO: 0 % (ref 0–0.9)
LDH SERPL L TO P-CCNC: 191 U/L (ref 84–246)
LYMPHOCYTES # BLD AUTO: 0.17 X10*3/UL (ref 0.8–3)
LYMPHOCYTES NFR BLD AUTO: 6.1 %
MCH RBC QN AUTO: 33.9 PG (ref 26–34)
MCHC RBC AUTO-ENTMCNC: 33.6 G/DL (ref 32–36)
MCV RBC AUTO: 101 FL (ref 80–100)
MONOCYTES # BLD AUTO: 0.46 X10*3/UL (ref 0.05–0.8)
MONOCYTES NFR BLD AUTO: 16.6 %
NEUTROPHILS # BLD AUTO: 2.09 X10*3/UL (ref 1.6–5.5)
NEUTROPHILS NFR BLD AUTO: 75.5 %
PLATELET # BLD AUTO: 106 X10*3/UL (ref 150–450)
POTASSIUM SERPL-SCNC: 4.4 MMOL/L (ref 3.5–5.3)
PROT SERPL-MCNC: 6.2 G/DL (ref 6.4–8.2)
RBC # BLD AUTO: 3.48 X10*6/UL (ref 4.5–5.9)
SODIUM SERPL-SCNC: 138 MMOL/L (ref 136–145)
WBC # BLD AUTO: 2.8 X10*3/UL (ref 4.4–11.3)

## 2023-12-05 PROCEDURE — 36415 COLL VENOUS BLD VENIPUNCTURE: CPT

## 2023-12-05 PROCEDURE — 84450 TRANSFERASE (AST) (SGOT): CPT

## 2023-12-05 PROCEDURE — 83615 LACTATE (LD) (LDH) ENZYME: CPT

## 2023-12-05 PROCEDURE — 82784 ASSAY IGA/IGD/IGG/IGM EACH: CPT

## 2023-12-05 PROCEDURE — 85025 COMPLETE CBC W/AUTO DIFF WBC: CPT

## 2023-12-05 PROCEDURE — 84075 ASSAY ALKALINE PHOSPHATASE: CPT

## 2023-12-06 LAB
IGA SERPL-MCNC: <7 MG/DL (ref 70–400)
IGG SERPL-MCNC: 231 MG/DL (ref 700–1600)
IGM SERPL-MCNC: 915 MG/DL (ref 40–230)

## 2023-12-14 ENCOUNTER — OFFICE VISIT (OUTPATIENT)
Dept: HEMATOLOGY/ONCOLOGY | Facility: HOSPITAL | Age: 83
End: 2023-12-14
Payer: MEDICARE

## 2023-12-14 VITALS
TEMPERATURE: 98.1 F | OXYGEN SATURATION: 99 % | BODY MASS INDEX: 29.78 KG/M2 | SYSTOLIC BLOOD PRESSURE: 137 MMHG | DIASTOLIC BLOOD PRESSURE: 73 MMHG | WEIGHT: 215.3 LBS | RESPIRATION RATE: 17 BRPM | HEART RATE: 76 BPM

## 2023-12-14 DIAGNOSIS — C88.0: ICD-10-CM

## 2023-12-14 PROCEDURE — 1159F MED LIST DOCD IN RCRD: CPT | Performed by: INTERNAL MEDICINE

## 2023-12-14 PROCEDURE — 1036F TOBACCO NON-USER: CPT | Performed by: INTERNAL MEDICINE

## 2023-12-14 PROCEDURE — 99215 OFFICE O/P EST HI 40 MIN: CPT | Performed by: INTERNAL MEDICINE

## 2023-12-14 PROCEDURE — 1125F AMNT PAIN NOTED PAIN PRSNT: CPT | Performed by: INTERNAL MEDICINE

## 2023-12-14 RX ORDER — ACYCLOVIR 400 MG/1
400 TABLET ORAL
COMMUNITY

## 2023-12-14 RX ORDER — SULFAMETHOXAZOLE AND TRIMETHOPRIM 800; 160 MG/1; MG/1
1 TABLET ORAL DAILY
COMMUNITY
End: 2023-12-14 | Stop reason: SDUPTHER

## 2023-12-14 RX ORDER — SULFAMETHOXAZOLE AND TRIMETHOPRIM 800; 160 MG/1; MG/1
1 TABLET ORAL DAILY
Qty: 20 TABLET | Refills: 0 | Status: SHIPPED | OUTPATIENT
Start: 2023-12-14 | End: 2024-01-03

## 2023-12-14 RX ORDER — ZINC SULFATE 50(220)MG
50 CAPSULE ORAL DAILY
COMMUNITY

## 2023-12-14 RX ORDER — ASCORBIC ACID 500 MG
500 TABLET ORAL DAILY
COMMUNITY

## 2023-12-14 RX ORDER — ACETAMINOPHEN 500 MG
500 TABLET ORAL EVERY 6 HOURS PRN
COMMUNITY

## 2023-12-14 RX ORDER — CHOLECALCIFEROL (VITAMIN D3) 125 MCG
125 CAPSULE ORAL DAILY
COMMUNITY

## 2023-12-14 ASSESSMENT — ENCOUNTER SYMPTOMS
LOSS OF SENSATION IN FEET: 0
DEPRESSION: 0
OCCASIONAL FEELINGS OF UNSTEADINESS: 0

## 2023-12-14 ASSESSMENT — PAIN SCALES - GENERAL: PAINLEVEL: 3

## 2023-12-28 NOTE — PROGRESS NOTES
Patient ID: Carlton Rose is a 83 y.o. male.  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 He was previously treated with plasmapheresis followed by rituximab, but had severe infusion reaction. He then switched a different premedication regimen, with dexamethasone that was started 1 day before the infusion. Other regimens were described above. He tolerated the treatment well. He has not been treated for the past few years.     Upon evaluating in June 2023, his presentation was consistent with WM in relapse. IgM was 3770 mg/dL. It was decided to started R-andrea. With his history of reaction, first dose ritux was given as an inpt and well tolerated. So far he completed 2 cycles of R-andrea, with C1 andrea at 60 and 70mg/m2, and C2-3 andrea dose at 90 mg/m2.     Today feels fatigue is improving. More recently  C6 BR (at [facility]) with andrea 81 mg/m2 in NOV 2023. Tolerating therapy well with mild fatigue. Able to complete daily activities without limitation. No fever, chills or night sweats. No lymphadenopathy or masses.     For LPL MZL in 2013  rituximab, chlorambucil, bendamustine. reported to have severe reactions to rituximab, managed with heavy premedication.     For relapsed WM in 2023  First dose of rituximab in 2023, given as an inpatient on 6/25 Saturday (Midnight): Dexamethasone 20 mg PO, Benadryl 50 mg PO, and Pepcid 40 mg PO  Sunday (8 am): Dexamethasone 20 mg, Pepcid 40 mg  Sunday (11:30am immediately prior to Infusion): Tylenol 650 mg, Benadryl 50 mg, Solumedrol 40 mg IV  Bendamustine -ritux  C1D1: 6/29/23, C6D1: 11/16/2023          Objective   BSA: 2.22 meters squared  /73   Pulse 76   Temp 36.7 °C (98.1 °F) (Skin)   Resp 17   Wt 97.7 kg (215  lb 4.8 oz)   SpO2 99%   BMI 29.78 kg/m²      has a past medical history of Aneurysm of other specified arteries (CMS/HCC), Decreased white blood cell count, unspecified, Familial hypercholesterolemia, Nonfamilial hypogammaglobulinemia (CMS/HCC), Other conditions influencing health status, Other conditions influencing health status, Personal history of diseases of the blood and blood-forming organs and certain disorders involving the immune mechanism, and Personal history of diseases of the blood and blood-forming organs and certain disorders involving the immune mechanism.   has a past surgical history that includes Cataract extraction (09/05/2013) and Other surgical history (12/04/2020).  Family History   Problem Relation Name Age of Onset    Alzheimer's disease Mother      Dementia Mother      Cataracts Mother      Heart failure Father      Cataracts Father      Hypertension Brother      Peripheral vascular disease Brother       Oncology History   Waldenstrom's macroglobulinemia with kappa light chains (CMS/HCC)   7/27/2023 -  Chemotherapy    Bendamustine + RiTUXimab, 28 Day Cycles     10/4/2023 Initial Diagnosis    Waldenstrom's macroglobulinemia with kappa light chains (CMS/HCC)         Carlton Rose  reports that he has never smoked. He has been exposed to tobacco smoke. He has never used smokeless tobacco.  He  reports no history of alcohol use.  He  reports no history of drug use.    EXAM: 2 small red nodules in the right hand, next to the IV site.   No LAD.     Physical Exam  Vitals reviewed.   Constitutional:       Appearance: Normal appearance.   HENT:      Head: Normocephalic and atraumatic.      Nose: Nose normal.      Mouth/Throat:      Mouth: Mucous membranes are moist.      Pharynx: Oropharynx is clear.   Eyes:      Extraocular Movements: Extraocular movements intact.      Conjunctiva/sclera: Conjunctivae normal.      Pupils: Pupils are equal, round, and reactive to light.   Cardiovascular:       Rate and Rhythm: Normal rate and regular rhythm.      Pulses: Normal pulses.      Heart sounds: Normal heart sounds.   Pulmonary:      Effort: Pulmonary effort is normal.      Breath sounds: Normal breath sounds.   Abdominal:      General: Bowel sounds are normal.      Palpations: Abdomen is soft.   Musculoskeletal:         General: Normal range of motion.      Cervical back: Normal range of motion.   Skin:     General: Skin is warm and dry.   Neurological:      General: No focal deficit present.      Mental Status: He is alert and oriented to person, place, and time.   Psychiatric:         Mood and Affect: Mood normal.         Behavior: Behavior normal.         Thought Content: Thought content normal.         Judgment: Judgment normal.         WBC   Date/Time Value Ref Range Status   12/05/2023 11:08 AM 2.8 (L) 4.4 - 11.3 x10*3/uL Final   11/16/2023 09:07 AM 2.5 (L) 4.4 - 11.3 x10*3/uL Final   11/02/2023 05:21 PM 10.0 4.4 - 11.3 x10*3/uL Final     nRBC   Date Value Ref Range Status   11/02/2023 0.0 0.0 - 0.0 /100 WBCs Final   06/26/2023 0.0 0.0 - 0.0 /100 WBC Final   06/25/2023 0.0 0.0 - 0.0 /100 WBC Final   06/24/2023 0.0 0.0 - 0.0 /100 WBC Final     RBC   Date Value Ref Range Status   12/05/2023 3.48 (L) 4.50 - 5.90 x10*6/uL Final   11/16/2023 3.63 (L) 4.50 - 5.90 x10*6/uL Final   11/02/2023 3.53 (L) 4.50 - 5.90 x10*6/uL Final     Hemoglobin   Date Value Ref Range Status   12/05/2023 11.8 (L) 13.5 - 17.5 g/dL Final   11/16/2023 12.2 (L) 13.5 - 17.5 g/dL Final   11/02/2023 12.2 (L) 13.5 - 17.5 g/dL Final     Hematocrit   Date Value Ref Range Status   12/05/2023 35.1 (L) 41.0 - 52.0 % Final   11/16/2023 36.0 (L) 41.0 - 52.0 % Final   11/02/2023 36.4 (L) 41.0 - 52.0 % Final     MCV   Date/Time Value Ref Range Status   12/05/2023 11:08  (H) 80 - 100 fL Final   11/16/2023 09:07 AM 99 80 - 100 fL Final   11/02/2023 05:21  (H) 80 - 100 fL Final     MCH   Date/Time Value Ref Range Status   12/05/2023 11:08 AM  33.9 26.0 - 34.0 pg Final   11/16/2023 09:07 AM 33.6 26.0 - 34.0 pg Final   11/02/2023 05:21 PM 34.6 (H) 26.0 - 34.0 pg Final     MCHC   Date/Time Value Ref Range Status   12/05/2023 11:08 AM 33.6 32.0 - 36.0 g/dL Final   11/16/2023 09:07 AM 33.9 32.0 - 36.0 g/dL Final   11/02/2023 05:21 PM 33.5 32.0 - 36.0 g/dL Final     RDW   Date/Time Value Ref Range Status   12/05/2023 11:08 AM 12.4 11.5 - 14.5 % Final   11/16/2023 09:07 AM 12.8 11.5 - 14.5 % Final   11/02/2023 05:21 PM 13.0 11.5 - 14.5 % Final     Platelets   Date/Time Value Ref Range Status   12/05/2023 11:08  (L) 150 - 450 x10*3/uL Final   11/16/2023 09:07  150 - 450 x10*3/uL Final   11/02/2023 05:21  150 - 450 x10*3/uL Final     MPV   Date/Time Value Ref Range Status   10/19/2023 09:48 AM 9.2 7.5 - 11.5 fL Final     Neutrophils %   Date/Time Value Ref Range Status   12/05/2023 11:08 AM 75.5 40.0 - 80.0 % Final   11/16/2023 09:07 AM 94.5 40.0 - 80.0 % Final   11/02/2023 05:21 PM 90.0 40.0 - 80.0 % Final     Immature Granulocytes %, Automated   Date/Time Value Ref Range Status   12/05/2023 11:08 AM 0.0 0.0 - 0.9 % Final     Comment:     Immature Granulocyte Count (IG) includes promyelocytes, myelocytes and metamyelocytes but does not include bands. Percent differential counts (%) should be interpreted in the context of the absolute cell counts (cells/UL).   11/16/2023 09:07 AM 0.4 0.0 - 0.9 % Final     Comment:     Immature Granulocyte Count (IG) includes promyelocytes, myelocytes and metamyelocytes but does not include bands. Percent differential counts (%) should be interpreted in the context of the absolute cell counts (cells/UL).   11/02/2023 05:21 PM 0.6 0.0 - 0.9 % Final     Comment:     Immature Granulocyte Count (IG) includes promyelocytes, myelocytes and metamyelocytes but does not include bands. Percent differential counts (%) should be interpreted in the context of the absolute cell counts (cells/UL).     Lymphocytes %   Date/Time  Value Ref Range Status   12/05/2023 11:08 AM 6.1 13.0 - 44.0 % Final   11/16/2023 09:07 AM 3.5 13.0 - 44.0 % Final   11/02/2023 05:21 PM 2.1 13.0 - 44.0 % Final     Monocytes %   Date/Time Value Ref Range Status   12/05/2023 11:08 AM 16.6 2.0 - 10.0 % Final   11/16/2023 09:07 AM 1.2 2.0 - 10.0 % Final   11/02/2023 05:21 PM 6.6 2.0 - 10.0 % Final     Eosinophils %   Date/Time Value Ref Range Status   12/05/2023 11:08 AM 1.1 0.0 - 6.0 % Final   11/16/2023 09:07 AM 0.0 0.0 - 6.0 % Final   11/02/2023 05:21 PM 0.5 0.0 - 6.0 % Final     Basophils %   Date/Time Value Ref Range Status   12/05/2023 11:08 AM 0.7 0.0 - 2.0 % Final   11/16/2023 09:07 AM 0.4 0.0 - 2.0 % Final   11/02/2023 05:21 PM 0.2 0.0 - 2.0 % Final     Neutrophils Absolute   Date/Time Value Ref Range Status   12/05/2023 11:08 AM 2.09 1.60 - 5.50 x10*3/uL Final     Comment:     Percent differential counts (%) should be interpreted in the context of the absolute cell counts (cells/uL).   11/16/2023 09:07 AM 2.40 1.60 - 5.50 x10*3/uL Final     Comment:     Percent differential counts (%) should be interpreted in the context of the absolute cell counts (cells/uL).   11/02/2023 05:21 PM 9.01 (H) 1.60 - 5.50 x10*3/uL Final     Comment:     Percent differential counts (%) should be interpreted in the context of the absolute cell counts (cells/uL).     Immature Granulocytes Absolute, Automated   Date/Time Value Ref Range Status   12/05/2023 11:08 AM 0.00 0.00 - 0.50 x10*3/uL Final   11/16/2023 09:07 AM 0.01 0.00 - 0.50 x10*3/uL Final   11/02/2023 05:21 PM 0.06 0.00 - 0.50 x10*3/uL Final     Lymphocytes Absolute   Date/Time Value Ref Range Status   12/05/2023 11:08 AM 0.17 (L) 0.80 - 3.00 x10*3/uL Final   11/16/2023 09:07 AM 0.09 (L) 0.80 - 3.00 x10*3/uL Final   11/02/2023 05:21 PM 0.21 (L) 0.80 - 3.00 x10*3/uL Final     Monocytes Absolute   Date/Time Value Ref Range Status   12/05/2023 11:08 AM 0.46 0.05 - 0.80 x10*3/uL Final   11/16/2023 09:07 AM 0.03 (L) 0.05 -  "0.80 x10*3/uL Final   11/02/2023 05:21 PM 0.66 0.05 - 0.80 x10*3/uL Final     Eosinophils Absolute   Date/Time Value Ref Range Status   12/05/2023 11:08 AM 0.03 0.00 - 0.40 x10*3/uL Final   11/16/2023 09:07 AM 0.00 0.00 - 0.40 x10*3/uL Final   11/02/2023 05:21 PM 0.05 0.00 - 0.40 x10*3/uL Final     Basophils Absolute   Date/Time Value Ref Range Status   12/05/2023 11:08 AM 0.02 0.00 - 0.10 x10*3/uL Final   11/16/2023 09:07 AM 0.01 0.00 - 0.10 x10*3/uL Final   11/02/2023 05:21 PM 0.02 0.00 - 0.10 x10*3/uL Final       No components found for: \"PT\"  aPTT   Date/Time Value Ref Range Status   06/24/2023 10:18 PM 28 27 - 38 sec Final     Comment:     Note new reference range as of 6/20/2023 at 10:00am.   03/07/2023 11:33 AM 35 26 - 39 sec Final     Comment:       THE APTT IS NO LONGER USED FOR MONITORING     UNFRACTIONATED HEPARIN THERAPY.    FOR MONITORING HEPARIN THERAPY,     USE THE HEPARIN ASSAY.         Assessment/Plan    Waldenstrom's macroglobulinemia with kappa light chains (CMS/HCC) - Primary C88.0        #WM  -Based on the BMBx in 2020, the MYD88 mutation, and the increase in IgM and viscosity, the diagnosis is consistent with WM.   -IgM has been steadily increasing, from the low of 1070 in Jan 2021 to 3770 mg/dL in June 2023.   -In addition, both hgb and PLT are mildly decreased.   -Given his age, this may be an ideal time to intervene, with the goal to reduce disease burden, prevent disease related complications.   -Treatment tolerated well, and response is satisfactory, c/w ME. Peak IgM 3770, now 915 mg/dL, representing a 75% reduction.      #Treatment  -Discussed BTKi, vs rituximab, and R-Anthony.   -patient is more interested in a treatment with a finite duration. Therefore discussed thoroughly the AEs of rituximab, bendamustine, and the drugs in combination. In particular, risks of severe/fatal infections were discussed. PML was also discussed. Severe and protracted cytoepenia or aplasia were also discussed. " Patient consented.   -Appropriate testing for HBV and other prophylaxis will be taken, including G-CSF, Bactrim for PJP, and acyclovir.   -Given severe reaction to ritux, he will receive 1st dose of ritux as an inpatient. Subsequently he will receive R-Anthony as an outpatient.   -The above approach proved safe. He tolerated 6 cycles of R-anthony.   Plan:    Completed chemo r-anthony.   Prophylaxis: acyclovir, bactrim.   -RTC 3mon. Labs 1 wk before visit at local .         Time spent: 45min, >50% on counseling and care coordination.        Problem List Items Addressed This Visit             ICD-10-CM    Waldenstrom's macroglobulinemia with kappa light chains (CMS/HCC) C88.0    Relevant Medications    sulfamethoxazole-trimethoprim (Bactrim DS) 800-160 mg tablet    Other Relevant Orders    Clinic Appointment Request Follow Up; DONNELL BUSTILLO MD PhD

## 2024-01-08 ENCOUNTER — OFFICE VISIT (OUTPATIENT)
Dept: OPHTHALMOLOGY | Facility: CLINIC | Age: 84
End: 2024-01-08
Payer: MEDICARE

## 2024-01-08 DIAGNOSIS — H34.239 BRANCH RETINAL ARTERY OCCLUSION, UNSPECIFIED LATERALITY: Primary | ICD-10-CM

## 2024-01-08 DIAGNOSIS — H35.89 OTHER SPECIFIED RETINAL DISORDERS: ICD-10-CM

## 2024-01-08 PROCEDURE — 1036F TOBACCO NON-USER: CPT | Performed by: OPHTHALMOLOGY

## 2024-01-08 PROCEDURE — 99214 OFFICE O/P EST MOD 30 MIN: CPT | Performed by: OPHTHALMOLOGY

## 2024-01-08 PROCEDURE — 92134 CPTRZ OPH DX IMG PST SGM RTA: CPT | Mod: BILATERAL PROCEDURE | Performed by: OPHTHALMOLOGY

## 2024-01-08 PROCEDURE — 1159F MED LIST DOCD IN RCRD: CPT | Performed by: OPHTHALMOLOGY

## 2024-01-08 PROCEDURE — 1125F AMNT PAIN NOTED PAIN PRSNT: CPT | Performed by: OPHTHALMOLOGY

## 2024-01-08 ASSESSMENT — ENCOUNTER SYMPTOMS
CARDIOVASCULAR NEGATIVE: 0
GASTROINTESTINAL NEGATIVE: 0
HEMATOLOGIC/LYMPHATIC NEGATIVE: 0
RESPIRATORY NEGATIVE: 0
EYES NEGATIVE: 1
NEUROLOGICAL NEGATIVE: 0
CONSTITUTIONAL NEGATIVE: 0
ALLERGIC/IMMUNOLOGIC NEGATIVE: 0
MUSCULOSKELETAL NEGATIVE: 0
PSYCHIATRIC NEGATIVE: 0
ENDOCRINE NEGATIVE: 0

## 2024-01-08 ASSESSMENT — VISUAL ACUITY
OS_PH_CC: 20/40
OD_CC+: -1
OS_CC: 20/50
METHOD: SNELLEN - LINEAR
OD_CC: 20/20
CORRECTION_TYPE: GLASSES

## 2024-01-08 ASSESSMENT — TONOMETRY
OD_IOP_MMHG: 13
IOP_METHOD: TONOPEN
OS_IOP_MMHG: 16

## 2024-01-08 ASSESSMENT — EXTERNAL EXAM - RIGHT EYE: OD_EXAM: NORMAL

## 2024-01-08 ASSESSMENT — SLIT LAMP EXAM - LIDS
COMMENTS: GOOD POSITION
COMMENTS: GOOD POSITION

## 2024-01-08 ASSESSMENT — EXTERNAL EXAM - LEFT EYE: OS_EXAM: NORMAL

## 2024-01-08 ASSESSMENT — CUP TO DISC RATIO
OS_RATIO: .85
OD_RATIO: .3

## 2024-01-08 NOTE — PROGRESS NOTES
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 hairy cell lukemia  2. 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

## 2024-03-07 ENCOUNTER — LAB (OUTPATIENT)
Dept: LAB | Facility: HOSPITAL | Age: 84
End: 2024-03-07
Payer: MEDICARE

## 2024-03-07 DIAGNOSIS — C88.0: ICD-10-CM

## 2024-03-07 LAB
ALBUMIN SERPL BCP-MCNC: 3.9 G/DL (ref 3.4–5)
ALP SERPL-CCNC: 74 U/L (ref 33–136)
ALT SERPL W P-5'-P-CCNC: 10 U/L (ref 10–52)
ANION GAP SERPL CALC-SCNC: 10 MMOL/L (ref 10–20)
AST SERPL W P-5'-P-CCNC: 20 U/L (ref 9–39)
BASOPHILS # BLD AUTO: 0.02 X10*3/UL (ref 0–0.1)
BASOPHILS NFR BLD AUTO: 0.7 %
BILIRUB SERPL-MCNC: 0.5 MG/DL (ref 0–1.2)
BUN SERPL-MCNC: 19 MG/DL (ref 6–23)
CALCIUM SERPL-MCNC: 9.3 MG/DL (ref 8.6–10.3)
CHLORIDE SERPL-SCNC: 102 MMOL/L (ref 98–107)
CO2 SERPL-SCNC: 30 MMOL/L (ref 21–32)
CREAT SERPL-MCNC: 1.25 MG/DL (ref 0.5–1.3)
EGFRCR SERPLBLD CKD-EPI 2021: 58 ML/MIN/1.73M*2
EOSINOPHIL # BLD AUTO: 0.03 X10*3/UL (ref 0–0.4)
EOSINOPHIL NFR BLD AUTO: 1.1 %
ERYTHROCYTE [DISTWIDTH] IN BLOOD BY AUTOMATED COUNT: 12.4 % (ref 11.5–14.5)
GLUCOSE SERPL-MCNC: 88 MG/DL (ref 74–99)
HCT VFR BLD AUTO: 38 % (ref 41–52)
HGB BLD-MCNC: 12.6 G/DL (ref 13.5–17.5)
IGA SERPL-MCNC: <7 MG/DL (ref 70–400)
IGG SERPL-MCNC: 225 MG/DL (ref 700–1600)
IGM SERPL-MCNC: 919 MG/DL (ref 40–230)
IMM GRANULOCYTES # BLD AUTO: 0 X10*3/UL (ref 0–0.5)
IMM GRANULOCYTES NFR BLD AUTO: 0 % (ref 0–0.9)
LDH SERPL L TO P-CCNC: 246 U/L (ref 84–246)
LYMPHOCYTES # BLD AUTO: 0.34 X10*3/UL (ref 0.8–3)
LYMPHOCYTES NFR BLD AUTO: 12.2 %
MCH RBC QN AUTO: 32.4 PG (ref 26–34)
MCHC RBC AUTO-ENTMCNC: 33.2 G/DL (ref 32–36)
MCV RBC AUTO: 98 FL (ref 80–100)
MONOCYTES # BLD AUTO: 0.4 X10*3/UL (ref 0.05–0.8)
MONOCYTES NFR BLD AUTO: 14.4 %
NEUTROPHILS # BLD AUTO: 1.99 X10*3/UL (ref 1.6–5.5)
NEUTROPHILS NFR BLD AUTO: 71.6 %
PLATELET # BLD AUTO: 123 X10*3/UL (ref 150–450)
POTASSIUM SERPL-SCNC: 4.4 MMOL/L (ref 3.5–5.3)
PROT SERPL-MCNC: 6.6 G/DL (ref 6.4–8.2)
RBC # BLD AUTO: 3.89 X10*6/UL (ref 4.5–5.9)
SODIUM SERPL-SCNC: 138 MMOL/L (ref 136–145)
WBC # BLD AUTO: 2.8 X10*3/UL (ref 4.4–11.3)

## 2024-03-07 PROCEDURE — 83615 LACTATE (LD) (LDH) ENZYME: CPT

## 2024-03-07 PROCEDURE — 82784 ASSAY IGA/IGD/IGG/IGM EACH: CPT | Mod: PORLAB

## 2024-03-07 PROCEDURE — 80053 COMPREHEN METABOLIC PANEL: CPT

## 2024-03-07 PROCEDURE — 36415 COLL VENOUS BLD VENIPUNCTURE: CPT

## 2024-03-07 PROCEDURE — 85025 COMPLETE CBC W/AUTO DIFF WBC: CPT

## 2024-03-14 ENCOUNTER — OFFICE VISIT (OUTPATIENT)
Dept: HEMATOLOGY/ONCOLOGY | Facility: HOSPITAL | Age: 84
End: 2024-03-14
Payer: MEDICARE

## 2024-03-14 VITALS
RESPIRATION RATE: 18 BRPM | TEMPERATURE: 97.3 F | HEART RATE: 64 BPM | SYSTOLIC BLOOD PRESSURE: 144 MMHG | DIASTOLIC BLOOD PRESSURE: 61 MMHG | OXYGEN SATURATION: 98 % | BODY MASS INDEX: 30.25 KG/M2 | WEIGHT: 218.7 LBS

## 2024-03-14 DIAGNOSIS — C88.0: ICD-10-CM

## 2024-03-14 PROCEDURE — 1036F TOBACCO NON-USER: CPT | Performed by: INTERNAL MEDICINE

## 2024-03-14 PROCEDURE — 99214 OFFICE O/P EST MOD 30 MIN: CPT | Performed by: INTERNAL MEDICINE

## 2024-03-14 PROCEDURE — 1126F AMNT PAIN NOTED NONE PRSNT: CPT | Performed by: INTERNAL MEDICINE

## 2024-03-14 PROCEDURE — 1159F MED LIST DOCD IN RCRD: CPT | Performed by: INTERNAL MEDICINE

## 2024-03-14 PROCEDURE — 1157F ADVNC CARE PLAN IN RCRD: CPT | Performed by: INTERNAL MEDICINE

## 2024-03-14 ASSESSMENT — PAIN SCALES - GENERAL: PAINLEVEL: 0-NO PAIN

## 2024-03-20 NOTE — PROGRESS NOTES
Patient ID: Carlton Rose is a 83 y.o. male.  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 He was previously treated with plasmapheresis followed by rituximab, but had severe infusion reaction. He then switched a different premedication regimen, with dexamethasone that was started 1 day before the infusion. Other regimens were described above. He tolerated the treatment well. He has not been treated for the past few years.     Upon evaluating in June 2023, his presentation was consistent with WM in relapse. IgM was 3770 mg/dL. It was decided to started R-andrea. With his history of reaction, first dose ritux was given as an inpt and well tolerated. So far he completed 2 cycles of R-andrea, with C1 andrea at 60 and 70mg/m2, and C2-3 andrea dose at 90 mg/m2.     More recently  C6 BR (at [facility]) with andrea 81 mg/m2 in NOV 2023. Tolerating therapy well with mild fatigue. Able to complete daily activities without limitation. No fever, chills or night sweats. No lymphadenopathy or masses.     Today feels well, mild fatigue. No fever, wt loss, or night sweats. No pain.     Treatment synopsis:  For LPL MZL in 2013  rituximab, chlorambucil, bendamustine. reported to have severe reactions to rituximab, managed with heavy premedication.     For relapsed WM in 2023  First dose of rituximab in 2023, given as an inpatient on 6/25 Saturday (Midnight): Dexamethasone 20 mg PO, Benadryl 50 mg PO, and Pepcid 40 mg PO  Sunday (8 am): Dexamethasone 20 mg, Pepcid 40 mg  Sunday (11:30am immediately prior to Infusion): Tylenol 650 mg, Benadryl 50 mg, Solumedrol 40 mg IV  Bendamustine -ritux  C1D1: 6/29/23, C6D1: 11/16/2023          Objective   BSA: 2.23 meters squared  /61 (BP  Location: Left arm, Patient Position: Sitting, BP Cuff Size: Adult)   Pulse 64   Temp 36.3 °C (97.3 °F) (Temporal)   Resp 18   Wt 99.2 kg (218 lb 11.1 oz)   SpO2 98%   BMI 30.25 kg/m²      has a past medical history of Aneurysm of other specified arteries (CMS/HCC), Decreased white blood cell count, unspecified, Familial hypercholesterolemia, Nonfamilial hypogammaglobulinemia (CMS/HCC), Other conditions influencing health status, Other conditions influencing health status, Personal history of diseases of the blood and blood-forming organs and certain disorders involving the immune mechanism, and Personal history of diseases of the blood and blood-forming organs and certain disorders involving the immune mechanism.   has a past surgical history that includes Cataract extraction (09/05/2013) and Other surgical history (12/04/2020).  Family History   Problem Relation Name Age of Onset    Alzheimer's disease Mother      Dementia Mother      Cataracts Mother      Heart failure Father      Cataracts Father      Hypertension Brother      Peripheral vascular disease Brother       Oncology History   Waldenstrom's macroglobulinemia with kappa light chains (CMS/HCC)   7/27/2023 -  Chemotherapy    Bendamustine + RiTUXimab, 28 Day Cycles     10/4/2023 Initial Diagnosis    Waldenstrom's macroglobulinemia with kappa light chains (CMS/HCC)         Carlton Rose  reports that he has never smoked. He has been exposed to tobacco smoke. He has never used smokeless tobacco.  He  reports no history of alcohol use.  He  reports no history of drug use.    EXAM: no LAD.     Physical Exam  Vitals reviewed.   Constitutional:       Appearance: Normal appearance.   HENT:      Head: Normocephalic and atraumatic.      Nose: Nose normal.      Mouth/Throat:      Mouth: Mucous membranes are moist.      Pharynx: Oropharynx is clear.   Eyes:      Extraocular Movements: Extraocular movements intact.      Pupils: Pupils are equal, round, and  reactive to light.      Comments: Right eye: small area of conjunctival hemorrhage.    Cardiovascular:      Rate and Rhythm: Normal rate and regular rhythm.      Pulses: Normal pulses.      Heart sounds: Normal heart sounds.   Pulmonary:      Effort: Pulmonary effort is normal.      Breath sounds: Normal breath sounds.   Abdominal:      General: Bowel sounds are normal.      Palpations: Abdomen is soft.   Musculoskeletal:         General: Normal range of motion.      Cervical back: Normal range of motion.   Skin:     General: Skin is warm and dry.   Neurological:      General: No focal deficit present.      Mental Status: He is alert and oriented to person, place, and time.   Psychiatric:         Mood and Affect: Mood normal.         Behavior: Behavior normal.         Thought Content: Thought content normal.         Judgment: Judgment normal.         WBC   Date/Time Value Ref Range Status   03/07/2024 11:24 AM 2.8 (L) 4.4 - 11.3 x10*3/uL Final   12/05/2023 11:08 AM 2.8 (L) 4.4 - 11.3 x10*3/uL Final   11/16/2023 09:07 AM 2.5 (L) 4.4 - 11.3 x10*3/uL Final     nRBC   Date Value Ref Range Status   11/02/2023 0.0 0.0 - 0.0 /100 WBCs Final   06/26/2023 0.0 0.0 - 0.0 /100 WBC Final   06/25/2023 0.0 0.0 - 0.0 /100 WBC Final   06/24/2023 0.0 0.0 - 0.0 /100 WBC Final     RBC   Date Value Ref Range Status   03/07/2024 3.89 (L) 4.50 - 5.90 x10*6/uL Final   12/05/2023 3.48 (L) 4.50 - 5.90 x10*6/uL Final   11/16/2023 3.63 (L) 4.50 - 5.90 x10*6/uL Final     Hemoglobin   Date Value Ref Range Status   03/07/2024 12.6 (L) 13.5 - 17.5 g/dL Final   12/05/2023 11.8 (L) 13.5 - 17.5 g/dL Final   11/16/2023 12.2 (L) 13.5 - 17.5 g/dL Final     Hematocrit   Date Value Ref Range Status   03/07/2024 38.0 (L) 41.0 - 52.0 % Final   12/05/2023 35.1 (L) 41.0 - 52.0 % Final   11/16/2023 36.0 (L) 41.0 - 52.0 % Final     MCV   Date/Time Value Ref Range Status   03/07/2024 11:24 AM 98 80 - 100 fL Final   12/05/2023 11:08  (H) 80 - 100 fL Final    11/16/2023 09:07 AM 99 80 - 100 fL Final     MCH   Date/Time Value Ref Range Status   03/07/2024 11:24 AM 32.4 26.0 - 34.0 pg Final   12/05/2023 11:08 AM 33.9 26.0 - 34.0 pg Final   11/16/2023 09:07 AM 33.6 26.0 - 34.0 pg Final     MCHC   Date/Time Value Ref Range Status   03/07/2024 11:24 AM 33.2 32.0 - 36.0 g/dL Final   12/05/2023 11:08 AM 33.6 32.0 - 36.0 g/dL Final   11/16/2023 09:07 AM 33.9 32.0 - 36.0 g/dL Final     RDW   Date/Time Value Ref Range Status   03/07/2024 11:24 AM 12.4 11.5 - 14.5 % Final   12/05/2023 11:08 AM 12.4 11.5 - 14.5 % Final   11/16/2023 09:07 AM 12.8 11.5 - 14.5 % Final     Platelets   Date/Time Value Ref Range Status   03/07/2024 11:24  (L) 150 - 450 x10*3/uL Final   12/05/2023 11:08  (L) 150 - 450 x10*3/uL Final   11/16/2023 09:07  150 - 450 x10*3/uL Final     MPV   Date/Time Value Ref Range Status   10/19/2023 09:48 AM 9.2 7.5 - 11.5 fL Final     Neutrophils %   Date/Time Value Ref Range Status   03/07/2024 11:24 AM 71.6 40.0 - 80.0 % Final   12/05/2023 11:08 AM 75.5 40.0 - 80.0 % Final   11/16/2023 09:07 AM 94.5 40.0 - 80.0 % Final     Immature Granulocytes %, Automated   Date/Time Value Ref Range Status   03/07/2024 11:24 AM 0.0 0.0 - 0.9 % Final     Comment:     Immature Granulocyte Count (IG) includes promyelocytes, myelocytes and metamyelocytes but does not include bands. Percent differential counts (%) should be interpreted in the context of the absolute cell counts (cells/UL).   12/05/2023 11:08 AM 0.0 0.0 - 0.9 % Final     Comment:     Immature Granulocyte Count (IG) includes promyelocytes, myelocytes and metamyelocytes but does not include bands. Percent differential counts (%) should be interpreted in the context of the absolute cell counts (cells/UL).   11/16/2023 09:07 AM 0.4 0.0 - 0.9 % Final     Comment:     Immature Granulocyte Count (IG) includes promyelocytes, myelocytes and metamyelocytes but does not include bands. Percent differential counts (%)  should be interpreted in the context of the absolute cell counts (cells/UL).     Lymphocytes %   Date/Time Value Ref Range Status   03/07/2024 11:24 AM 12.2 13.0 - 44.0 % Final   12/05/2023 11:08 AM 6.1 13.0 - 44.0 % Final   11/16/2023 09:07 AM 3.5 13.0 - 44.0 % Final     Monocytes %   Date/Time Value Ref Range Status   03/07/2024 11:24 AM 14.4 2.0 - 10.0 % Final   12/05/2023 11:08 AM 16.6 2.0 - 10.0 % Final   11/16/2023 09:07 AM 1.2 2.0 - 10.0 % Final     Eosinophils %   Date/Time Value Ref Range Status   03/07/2024 11:24 AM 1.1 0.0 - 6.0 % Final   12/05/2023 11:08 AM 1.1 0.0 - 6.0 % Final   11/16/2023 09:07 AM 0.0 0.0 - 6.0 % Final     Basophils %   Date/Time Value Ref Range Status   03/07/2024 11:24 AM 0.7 0.0 - 2.0 % Final   12/05/2023 11:08 AM 0.7 0.0 - 2.0 % Final   11/16/2023 09:07 AM 0.4 0.0 - 2.0 % Final     Neutrophils Absolute   Date/Time Value Ref Range Status   03/07/2024 11:24 AM 1.99 1.60 - 5.50 x10*3/uL Final     Comment:     Percent differential counts (%) should be interpreted in the context of the absolute cell counts (cells/uL).   12/05/2023 11:08 AM 2.09 1.60 - 5.50 x10*3/uL Final     Comment:     Percent differential counts (%) should be interpreted in the context of the absolute cell counts (cells/uL).   11/16/2023 09:07 AM 2.40 1.60 - 5.50 x10*3/uL Final     Comment:     Percent differential counts (%) should be interpreted in the context of the absolute cell counts (cells/uL).     Immature Granulocytes Absolute, Automated   Date/Time Value Ref Range Status   03/07/2024 11:24 AM 0.00 0.00 - 0.50 x10*3/uL Final   12/05/2023 11:08 AM 0.00 0.00 - 0.50 x10*3/uL Final   11/16/2023 09:07 AM 0.01 0.00 - 0.50 x10*3/uL Final     Lymphocytes Absolute   Date/Time Value Ref Range Status   03/07/2024 11:24 AM 0.34 (L) 0.80 - 3.00 x10*3/uL Final   12/05/2023 11:08 AM 0.17 (L) 0.80 - 3.00 x10*3/uL Final   11/16/2023 09:07 AM 0.09 (L) 0.80 - 3.00 x10*3/uL Final     Monocytes Absolute   Date/Time Value Ref  "Range Status   03/07/2024 11:24 AM 0.40 0.05 - 0.80 x10*3/uL Final   12/05/2023 11:08 AM 0.46 0.05 - 0.80 x10*3/uL Final   11/16/2023 09:07 AM 0.03 (L) 0.05 - 0.80 x10*3/uL Final     Eosinophils Absolute   Date/Time Value Ref Range Status   03/07/2024 11:24 AM 0.03 0.00 - 0.40 x10*3/uL Final   12/05/2023 11:08 AM 0.03 0.00 - 0.40 x10*3/uL Final   11/16/2023 09:07 AM 0.00 0.00 - 0.40 x10*3/uL Final     Basophils Absolute   Date/Time Value Ref Range Status   03/07/2024 11:24 AM 0.02 0.00 - 0.10 x10*3/uL Final   12/05/2023 11:08 AM 0.02 0.00 - 0.10 x10*3/uL Final   11/16/2023 09:07 AM 0.01 0.00 - 0.10 x10*3/uL Final       No components found for: \"PT\"  aPTT   Date/Time Value Ref Range Status   06/24/2023 10:18 PM 28 27 - 38 sec Final     Comment:     Note new reference range as of 6/20/2023 at 10:00am.   03/07/2023 11:33 AM 35 26 - 39 sec Final     Comment:       THE APTT IS NO LONGER USED FOR MONITORING     UNFRACTIONATED HEPARIN THERAPY.    FOR MONITORING HEPARIN THERAPY,     USE THE HEPARIN ASSAY.         Assessment/Plan       WM  -Based on the BMBx in 2020, the MYD88 mutation, and the increase in IgM and viscosity, the diagnosis is consistent with WM.   -IgM has been steadily increasing, from the low of 1070 in Jan 2021 to 3770 mg/dL in June 2023.   -In addition, both hgb and PLT are mildly decreased.   -Given his age, this may be an ideal time to intervene, with the goal to reduce disease burden, prevent disease related complications.   -Treatment of 6 R-KIRA was tolerated well, and response is satisfactory, c/w CO. Peak IgM 3770, now 915 mg/dL, representing a 75% reduction. Very mild anemia and thrombocytopenia.      #Treatment  -Discussed BTKi, vs rituximab, and R-Kira.   -patient is more interested in a treatment with a finite duration. Therefore discussed thoroughly the AEs of rituximab, bendamustine, and the drugs in combination. In particular, risks of severe/fatal infections were discussed. PML was also " discussed. Severe and protracted cytoepenia or aplasia were also discussed. Patient consented.   -Appropriate testing for HBV and other prophylaxis will be taken, including G-CSF, Bactrim for PJP, and acyclovir.   -Given severe reaction to ritux, he will receive 1st dose of ritux as an inpatient. Subsequently he will receive R-Anthony as an outpatient.   -The above approach proved safe. He tolerated 6 cycles of R-anthony.   -Not on treatment any more.     #conjunctival hemorrhage  -Asymptomatic. Not associated with headache or any evidence of WM relapse.   -He is instructed to see Ophthalmology if this worsens.       Plan:   -Completed chemo r-anthony in 11/2023.   -Prophylaxis: acyclovir, Bactrim completed.   -RTC 3mon. Labs 1 wk before visit at local .    Time spent: 35min, >50% on counseling and care coordination.        Problem List Items Addressed This Visit             ICD-10-CM    Waldenstrom's macroglobulinemia with kappa light chains (CMS/HCC) C88.0    Relevant Orders    Clinic Appointment Request [facility]    Infusion Appointment Request [facility]        Manny Lopez MD PhD

## 2024-05-13 ENCOUNTER — OFFICE VISIT (OUTPATIENT)
Dept: OPHTHALMOLOGY | Facility: CLINIC | Age: 84
End: 2024-05-13
Payer: MEDICARE

## 2024-05-13 DIAGNOSIS — Z86.69 HISTORY OF RETINAL DETACHMENT: ICD-10-CM

## 2024-05-13 DIAGNOSIS — H53.483 GENERALIZED CONTRACTION OF VISUAL FIELD, BILATERAL: ICD-10-CM

## 2024-05-13 DIAGNOSIS — H52.4 ASTIGMATISM OF BOTH EYES WITH PRESBYOPIA: ICD-10-CM

## 2024-05-13 DIAGNOSIS — H52.203 ASTIGMATISM OF BOTH EYES WITH PRESBYOPIA: ICD-10-CM

## 2024-05-13 DIAGNOSIS — C88.0: ICD-10-CM

## 2024-05-13 DIAGNOSIS — H34.232 BRAO (BRANCH RETINAL ARTERY OCCLUSION), LEFT: ICD-10-CM

## 2024-05-13 DIAGNOSIS — H34.239 BRANCH RETINAL ARTERY OCCLUSION, UNSPECIFIED LATERALITY: Primary | ICD-10-CM

## 2024-05-13 PROCEDURE — 99214 OFFICE O/P EST MOD 30 MIN: CPT | Performed by: OPHTHALMOLOGY

## 2024-05-13 PROCEDURE — 92134 CPTRZ OPH DX IMG PST SGM RTA: CPT | Performed by: OPHTHALMOLOGY

## 2024-05-13 ASSESSMENT — CONF VISUAL FIELD
OS_INFERIOR_TEMPORAL_RESTRICTION: 0
OS_SUPERIOR_TEMPORAL_RESTRICTION: 0
OD_SUPERIOR_NASAL_RESTRICTION: 0
OD_SUPERIOR_TEMPORAL_RESTRICTION: 0
OS_NORMAL: 1
OD_NORMAL: 1
OD_INFERIOR_NASAL_RESTRICTION: 0
OS_SUPERIOR_NASAL_RESTRICTION: 0
OD_INFERIOR_TEMPORAL_RESTRICTION: 0
OS_INFERIOR_NASAL_RESTRICTION: 0

## 2024-05-13 ASSESSMENT — SLIT LAMP EXAM - LIDS
COMMENTS: GOOD POSITION
COMMENTS: GOOD POSITION

## 2024-05-13 ASSESSMENT — VISUAL ACUITY
OD_SC: 20/25
METHOD: SNELLEN - LINEAR
OS_SC: 20/50
OS_PH_SC: 20/40

## 2024-05-13 ASSESSMENT — EXTERNAL EXAM - LEFT EYE: OS_EXAM: NORMAL

## 2024-05-13 ASSESSMENT — CUP TO DISC RATIO
OS_RATIO: .85
OD_RATIO: .3

## 2024-05-13 ASSESSMENT — TONOMETRY
IOP_METHOD: GOLDMANN APPLANATION
OS_IOP_MMHG: 14
OD_IOP_MMHG: 14

## 2024-05-13 ASSESSMENT — EXTERNAL EXAM - RIGHT EYE: OD_EXAM: NORMAL

## 2024-05-13 ASSESSMENT — ENCOUNTER SYMPTOMS: EYES NEGATIVE: 1

## 2024-05-13 NOTE — PROGRESS NOTES
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 hairy cell lukemia  2. 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

## 2024-06-06 ENCOUNTER — LAB (OUTPATIENT)
Dept: LAB | Facility: HOSPITAL | Age: 84
End: 2024-06-06
Payer: MEDICARE

## 2024-06-06 DIAGNOSIS — C88.0: ICD-10-CM

## 2024-06-06 LAB
ALBUMIN SERPL BCP-MCNC: 4 G/DL (ref 3.4–5)
ALP SERPL-CCNC: 78 U/L (ref 33–136)
ALT SERPL W P-5'-P-CCNC: 13 U/L (ref 10–52)
ANION GAP SERPL CALC-SCNC: 12 MMOL/L (ref 10–20)
AST SERPL W P-5'-P-CCNC: 18 U/L (ref 9–39)
BASOPHILS # BLD AUTO: 0.01 X10*3/UL (ref 0–0.1)
BASOPHILS NFR BLD AUTO: 0.3 %
BILIRUB SERPL-MCNC: 0.7 MG/DL (ref 0–1.2)
BUN SERPL-MCNC: 20 MG/DL (ref 6–23)
CALCIUM SERPL-MCNC: 9 MG/DL (ref 8.6–10.3)
CHLORIDE SERPL-SCNC: 104 MMOL/L (ref 98–107)
CO2 SERPL-SCNC: 25 MMOL/L (ref 21–32)
CREAT SERPL-MCNC: 1.14 MG/DL (ref 0.5–1.3)
EGFRCR SERPLBLD CKD-EPI 2021: 65 ML/MIN/1.73M*2
EOSINOPHIL # BLD AUTO: 0.08 X10*3/UL (ref 0–0.4)
EOSINOPHIL NFR BLD AUTO: 2.8 %
ERYTHROCYTE [DISTWIDTH] IN BLOOD BY AUTOMATED COUNT: 12.6 % (ref 11.5–14.5)
GLUCOSE SERPL-MCNC: 100 MG/DL (ref 74–99)
HCT VFR BLD AUTO: 36.7 % (ref 41–52)
HGB BLD-MCNC: 12.4 G/DL (ref 13.5–17.5)
IGA SERPL-MCNC: <7 MG/DL (ref 70–400)
IGG SERPL-MCNC: 170 MG/DL (ref 700–1600)
IGM SERPL-MCNC: 803 MG/DL (ref 40–230)
IMM GRANULOCYTES # BLD AUTO: 0 X10*3/UL (ref 0–0.5)
IMM GRANULOCYTES NFR BLD AUTO: 0 % (ref 0–0.9)
LDH SERPL L TO P-CCNC: 201 U/L (ref 84–246)
LYMPHOCYTES # BLD AUTO: 0.3 X10*3/UL (ref 0.8–3)
LYMPHOCYTES NFR BLD AUTO: 10.4 %
MCH RBC QN AUTO: 32.3 PG (ref 26–34)
MCHC RBC AUTO-ENTMCNC: 33.8 G/DL (ref 32–36)
MCV RBC AUTO: 96 FL (ref 80–100)
MONOCYTES # BLD AUTO: 0.39 X10*3/UL (ref 0.05–0.8)
MONOCYTES NFR BLD AUTO: 13.5 %
NEUTROPHILS # BLD AUTO: 2.11 X10*3/UL (ref 1.6–5.5)
NEUTROPHILS NFR BLD AUTO: 73 %
PLATELET # BLD AUTO: 157 X10*3/UL (ref 150–450)
POTASSIUM SERPL-SCNC: 4.4 MMOL/L (ref 3.5–5.3)
PROT SERPL-MCNC: 6 G/DL (ref 6.4–8.2)
RBC # BLD AUTO: 3.84 X10*6/UL (ref 4.5–5.9)
SODIUM SERPL-SCNC: 137 MMOL/L (ref 136–145)
WBC # BLD AUTO: 2.9 X10*3/UL (ref 4.4–11.3)

## 2024-06-06 PROCEDURE — 36415 COLL VENOUS BLD VENIPUNCTURE: CPT

## 2024-06-06 PROCEDURE — 82784 ASSAY IGA/IGD/IGG/IGM EACH: CPT | Mod: PORLAB

## 2024-06-06 PROCEDURE — 83615 LACTATE (LD) (LDH) ENZYME: CPT

## 2024-06-06 PROCEDURE — 84075 ASSAY ALKALINE PHOSPHATASE: CPT

## 2024-06-06 PROCEDURE — 85025 COMPLETE CBC W/AUTO DIFF WBC: CPT

## 2024-06-13 ENCOUNTER — INFUSION (OUTPATIENT)
Dept: HEMATOLOGY/ONCOLOGY | Facility: HOSPITAL | Age: 84
End: 2024-06-13
Payer: MEDICARE

## 2024-06-13 ENCOUNTER — OFFICE VISIT (OUTPATIENT)
Dept: HEMATOLOGY/ONCOLOGY | Facility: HOSPITAL | Age: 84
End: 2024-06-13
Payer: MEDICARE

## 2024-06-13 VITALS
DIASTOLIC BLOOD PRESSURE: 69 MMHG | RESPIRATION RATE: 16 BRPM | TEMPERATURE: 98.4 F | SYSTOLIC BLOOD PRESSURE: 141 MMHG | HEART RATE: 72 BPM

## 2024-06-13 DIAGNOSIS — C88.0: ICD-10-CM

## 2024-06-13 PROCEDURE — 1157F ADVNC CARE PLAN IN RCRD: CPT | Performed by: NURSE PRACTITIONER

## 2024-06-13 PROCEDURE — 1160F RVW MEDS BY RX/DR IN RCRD: CPT | Performed by: NURSE PRACTITIONER

## 2024-06-13 PROCEDURE — 99214 OFFICE O/P EST MOD 30 MIN: CPT | Performed by: NURSE PRACTITIONER

## 2024-06-13 PROCEDURE — 99211 OFF/OP EST MAY X REQ PHY/QHP: CPT

## 2024-06-13 PROCEDURE — 1159F MED LIST DOCD IN RCRD: CPT | Performed by: NURSE PRACTITIONER

## 2024-06-13 NOTE — PROGRESS NOTES
Patient ID: Carlton Rose is a 83 y.o. male.    Subjective    HPI  Presents today for routine follow up visit [facility], accompanied by his wife.      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 He was previously treated with plasmapheresis followed by rituximab, but had severe infusion reaction. He then switched a different premedication regimen, with dexamethasone that was started 1 day before the infusion. Other regimens were described above. He tolerated the treatment well. He has not been treated for the past few years.      Upon evaluating in June 2023, his presentation was consistent with WM in relapse. IgM was 3770 mg/dL. It was decided to started R-andrea. With his history of reaction, first dose ritux was given as an inpt and well tolerated. So far he completed 2 cycles of R-andrea, with C1 andrea at 60 and 70mg/m2, and C2-3 andrea dose at 90 mg/m2.      More recently  C6 BR (at [facility]) with andrea 81 mg/m2 in NOV 2023.      Today he is doing well with no acute issues. Continues to complete daily activities without limitation. No fevers, chills, nightsweats, lymphadenopathy.      Treatment synopsis:  For LPL MZL in 2013  rituximab, chlorambucil, bendamustine. reported to have severe reactions to rituximab, managed with heavy premedication.      For relapsed WM in 2023  First dose of rituximab in 2023, given as an inpatient on 6/25 Saturday (Midnight): Dexamethasone 20 mg PO, Benadryl 50 mg PO, and Pepcid 40 mg PO  Sunday (8 am): Dexamethasone 20 mg, Pepcid 40 mg  Sunday (11:30am immediately prior to Infusion): Tylenol 650 mg, Benadryl 50 mg, Solumedrol 40 mg IV  Bendamustine -ritux  C1D1: 6/29/23, C6D1: 11/16/2023    Review of Systems   All other systems reviewed and are negative.      Objective    BSA: There is no height or weight on file to calculate BSA.  There were no vitals taken for this visit.  Vital signs  reviewed today.      Physical Exam  Constitutional:       Appearance: Normal appearance.   HENT:      Head: Normocephalic.      Nose: Nose normal.      Mouth/Throat:      Mouth: Mucous membranes are moist.      Pharynx: Oropharynx is clear.   Eyes:      Extraocular Movements: Extraocular movements intact.      Conjunctiva/sclera: Conjunctivae normal.      Pupils: Pupils are equal, round, and reactive to light.   Cardiovascular:      Rate and Rhythm: Normal rate and regular rhythm.      Pulses: Normal pulses.      Heart sounds: Normal heart sounds.   Pulmonary:      Effort: Pulmonary effort is normal.      Breath sounds: Normal breath sounds.   Abdominal:      General: Abdomen is flat. Bowel sounds are normal.      Palpations: Abdomen is soft.   Musculoskeletal:         General: Normal range of motion.      Cervical back: Normal range of motion.   Skin:     General: Skin is warm and dry.      Capillary Refill: Capillary refill takes less than 2 seconds.   Neurological:      General: No focal deficit present.      Mental Status: He is alert and oriented to person, place, and time. Mental status is at baseline.   Psychiatric:         Mood and Affect: Mood normal.         Performance Status:  Karnofsky Score: 90 - Able to carry on normal activity; minor signs or symptoms of disease       Assessment/Plan        Oncology History   Waldenstrom's macroglobulinemia with kappa light chains (Multi)   7/27/2023 -  Chemotherapy    Bendamustine + RiTUXimab, 28 Day Cycles     10/4/2023 Initial Diagnosis    Waldenstrom's macroglobulinemia with kappa light chains (CMS/HCC)          WM  -Based on the BMBx in 2020, the MYD88 mutation, and the increase in IgM and viscosity, the diagnosis is consistent with WM.   -IgM has been steadily increasing, from the low of 1070 in Jan 2021 to 3770 mg/dL in June 2023.   -In addition, both hgb and PLT are mildly decreased.   -Given his age, this may be an ideal time to intervene, with the goal to  reduce disease burden, prevent disease related complications.   -Treatment of 6 R-KIRA was tolerated well, and response is satisfactory, c/w NE. Peak IgM 3770, now 915 mg/dL, representing a 75% reduction. Very mild anemia and thrombocytopenia.      #Treatment  -Discussed BTKi, vs rituximab, and R-Kira.   -patient is more interested in a treatment with a finite duration. Therefore discussed thoroughly the AEs of rituximab, bendamustine, and the drugs in combination. In particular, risks of severe/fatal infections were discussed. PML was also discussed. Severe and protracted cytoepenia or aplasia were also discussed. Patient consented.   -Appropriate testing for HBV and other prophylaxis will be taken, including G-CSF, Bactrim for PJP, and acyclovir.   -Given severe reaction to ritux, he will receive 1st dose of ritux as an inpatient. Subsequently he will receive R-Kira as an outpatient.   -The above approach proved safe. He tolerated 6 cycles of R-kira.   -Not on treatment any more.      #conjunctival hemorrhage  -Asymptomatic. Not associated with headache or any evidence of WM relapse.   -He is instructed to see Ophthalmology if this worsens.       Plan:   -Completed chemo r-kira in 11/2023.   -Prophylaxis: acyclovir, Bactrim completed.   -RTC 3mon. Labs 1 wk before visit at local .      JOSE Johnson-CNP  [facility]

## 2024-09-13 ENCOUNTER — APPOINTMENT (OUTPATIENT)
Dept: HEMATOLOGY/ONCOLOGY | Facility: HOSPITAL | Age: 84
End: 2024-09-13
Payer: MEDICARE

## 2024-09-23 ENCOUNTER — APPOINTMENT (OUTPATIENT)
Dept: OPHTHALMOLOGY | Facility: CLINIC | Age: 84
End: 2024-09-23
Payer: MEDICARE

## 2024-09-23 DIAGNOSIS — C88.0 WALDENSTROM'S MACROGLOBULINEMIA WITH KAPPA LIGHT CHAINS: ICD-10-CM

## 2024-09-23 DIAGNOSIS — Z86.69 HISTORY OF RETINAL DETACHMENT: ICD-10-CM

## 2024-09-23 DIAGNOSIS — H34.232 BRAO (BRANCH RETINAL ARTERY OCCLUSION), LEFT: ICD-10-CM

## 2024-09-23 DIAGNOSIS — H35.89 OTHER SPECIFIED RETINAL DISORDERS: ICD-10-CM

## 2024-09-23 DIAGNOSIS — H34.239 BRANCH RETINAL ARTERY OCCLUSION, UNSPECIFIED LATERALITY: Primary | ICD-10-CM

## 2024-09-23 PROCEDURE — 92134 CPTRZ OPH DX IMG PST SGM RTA: CPT | Performed by: OPHTHALMOLOGY

## 2024-09-23 PROCEDURE — 99213 OFFICE O/P EST LOW 20 MIN: CPT | Performed by: OPHTHALMOLOGY

## 2024-09-23 ASSESSMENT — ENCOUNTER SYMPTOMS
CARDIOVASCULAR NEGATIVE: 0
RESPIRATORY NEGATIVE: 0
ENDOCRINE NEGATIVE: 0
GASTROINTESTINAL NEGATIVE: 0
NEUROLOGICAL NEGATIVE: 0
CONSTITUTIONAL NEGATIVE: 0
EYES NEGATIVE: 1
HEMATOLOGIC/LYMPHATIC NEGATIVE: 0
MUSCULOSKELETAL NEGATIVE: 0
PSYCHIATRIC NEGATIVE: 0
ALLERGIC/IMMUNOLOGIC NEGATIVE: 0

## 2024-09-23 ASSESSMENT — SLIT LAMP EXAM - LIDS
COMMENTS: GOOD POSITION
COMMENTS: GOOD POSITION

## 2024-09-23 ASSESSMENT — VISUAL ACUITY
OS_PH_SC+: -1
OS_SC: 20/50
OS_PH_SC: 20/40
OD_SC: 20/20
METHOD: SNELLEN - LINEAR

## 2024-09-23 ASSESSMENT — EXTERNAL EXAM - LEFT EYE: OS_EXAM: NORMAL

## 2024-09-23 ASSESSMENT — CUP TO DISC RATIO
OD_RATIO: .3
OS_RATIO: .85

## 2024-09-23 ASSESSMENT — TONOMETRY
OD_IOP_MMHG: 12
IOP_METHOD: GOLDMANN APPLANATION
OS_IOP_MMHG: 12

## 2024-09-23 ASSESSMENT — EXTERNAL EXAM - RIGHT EYE: OD_EXAM: NORMAL

## 2024-09-23 NOTE — PROGRESS NOTES
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 hairy cell lukemia  2. 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

## 2024-10-10 ENCOUNTER — LAB (OUTPATIENT)
Dept: LAB | Facility: HOSPITAL | Age: 84
End: 2024-10-10
Payer: MEDICARE

## 2024-10-10 DIAGNOSIS — C88.00 WALDENSTROM'S MACROGLOBULINEMIA WITH KAPPA LIGHT CHAINS: ICD-10-CM

## 2024-10-10 LAB
ALBUMIN SERPL BCP-MCNC: 4 G/DL (ref 3.4–5)
ALP SERPL-CCNC: 71 U/L (ref 33–136)
ALT SERPL W P-5'-P-CCNC: 13 U/L (ref 10–52)
ANION GAP SERPL CALC-SCNC: 11 MMOL/L (ref 10–20)
AST SERPL W P-5'-P-CCNC: 22 U/L (ref 9–39)
BASOPHILS # BLD AUTO: 0.01 X10*3/UL (ref 0–0.1)
BASOPHILS NFR BLD AUTO: 0.3 %
BILIRUB SERPL-MCNC: 0.6 MG/DL (ref 0–1.2)
BUN SERPL-MCNC: 15 MG/DL (ref 6–23)
CALCIUM SERPL-MCNC: 9.3 MG/DL (ref 8.6–10.3)
CHLORIDE SERPL-SCNC: 101 MMOL/L (ref 98–107)
CO2 SERPL-SCNC: 30 MMOL/L (ref 21–32)
CREAT SERPL-MCNC: 1.15 MG/DL (ref 0.5–1.3)
EGFRCR SERPLBLD CKD-EPI 2021: 64 ML/MIN/1.73M*2
EOSINOPHIL # BLD AUTO: 0.03 X10*3/UL (ref 0–0.4)
EOSINOPHIL NFR BLD AUTO: 0.9 %
ERYTHROCYTE [DISTWIDTH] IN BLOOD BY AUTOMATED COUNT: 12.4 % (ref 11.5–14.5)
GLUCOSE SERPL-MCNC: 90 MG/DL (ref 74–99)
HCT VFR BLD AUTO: 39.6 % (ref 41–52)
HGB BLD-MCNC: 13.1 G/DL (ref 13.5–17.5)
IGA SERPL-MCNC: <7 MG/DL (ref 70–400)
IGG SERPL-MCNC: 170 MG/DL (ref 700–1600)
IGM SERPL-MCNC: 632 MG/DL (ref 40–230)
IMM GRANULOCYTES # BLD AUTO: 0.01 X10*3/UL (ref 0–0.5)
IMM GRANULOCYTES NFR BLD AUTO: 0.3 % (ref 0–0.9)
LDH SERPL L TO P-CCNC: 199 U/L (ref 84–246)
LYMPHOCYTES # BLD AUTO: 0.53 X10*3/UL (ref 0.8–3)
LYMPHOCYTES NFR BLD AUTO: 15.7 %
MCH RBC QN AUTO: 32.4 PG (ref 26–34)
MCHC RBC AUTO-ENTMCNC: 33.1 G/DL (ref 32–36)
MCV RBC AUTO: 98 FL (ref 80–100)
MONOCYTES # BLD AUTO: 0.39 X10*3/UL (ref 0.05–0.8)
MONOCYTES NFR BLD AUTO: 11.5 %
NEUTROPHILS # BLD AUTO: 2.41 X10*3/UL (ref 1.6–5.5)
NEUTROPHILS NFR BLD AUTO: 71.3 %
PLATELET # BLD AUTO: 136 X10*3/UL (ref 150–450)
POTASSIUM SERPL-SCNC: 4.4 MMOL/L (ref 3.5–5.3)
PROT SERPL-MCNC: 6.3 G/DL (ref 6.4–8.2)
RBC # BLD AUTO: 4.04 X10*6/UL (ref 4.5–5.9)
SODIUM SERPL-SCNC: 138 MMOL/L (ref 136–145)
WBC # BLD AUTO: 3.4 X10*3/UL (ref 4.4–11.3)

## 2024-10-10 PROCEDURE — 36415 COLL VENOUS BLD VENIPUNCTURE: CPT

## 2024-10-10 PROCEDURE — 83615 LACTATE (LD) (LDH) ENZYME: CPT

## 2024-10-10 PROCEDURE — 82784 ASSAY IGA/IGD/IGG/IGM EACH: CPT | Mod: PORLAB

## 2024-10-10 PROCEDURE — 80053 COMPREHEN METABOLIC PANEL: CPT

## 2024-10-10 PROCEDURE — 85025 COMPLETE CBC W/AUTO DIFF WBC: CPT

## 2024-10-14 ENCOUNTER — APPOINTMENT (OUTPATIENT)
Dept: OPHTHALMOLOGY | Facility: CLINIC | Age: 84
End: 2024-10-14
Payer: MEDICARE

## 2024-10-18 ENCOUNTER — OFFICE VISIT (OUTPATIENT)
Dept: HEMATOLOGY/ONCOLOGY | Facility: HOSPITAL | Age: 84
End: 2024-10-18
Payer: MEDICARE

## 2024-10-18 VITALS
OXYGEN SATURATION: 97 % | HEART RATE: 61 BPM | RESPIRATION RATE: 16 BRPM | TEMPERATURE: 97.9 F | SYSTOLIC BLOOD PRESSURE: 141 MMHG | BODY MASS INDEX: 29.96 KG/M2 | DIASTOLIC BLOOD PRESSURE: 75 MMHG | WEIGHT: 216.6 LBS

## 2024-10-18 DIAGNOSIS — C88.00 WALDENSTROM'S MACROGLOBULINEMIA WITH KAPPA LIGHT CHAINS: ICD-10-CM

## 2024-10-18 PROCEDURE — 1159F MED LIST DOCD IN RCRD: CPT | Performed by: INTERNAL MEDICINE

## 2024-10-18 PROCEDURE — 1157F ADVNC CARE PLAN IN RCRD: CPT | Performed by: INTERNAL MEDICINE

## 2024-10-18 PROCEDURE — 99213 OFFICE O/P EST LOW 20 MIN: CPT | Performed by: INTERNAL MEDICINE

## 2024-10-18 PROCEDURE — 1125F AMNT PAIN NOTED PAIN PRSNT: CPT | Performed by: INTERNAL MEDICINE

## 2024-10-18 ASSESSMENT — PAIN SCALES - GENERAL: PAINLEVEL_OUTOF10: 1

## 2024-10-18 NOTE — PROGRESS NOTES
Patient ID: Carlton Rose is a 83 y.o. male.  Referring Physician: No referring provider defined for this encounter.  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 He was previously treated with plasmapheresis followed by rituximab, but had severe infusion reaction. He then switched a different premedication regimen, with dexamethasone that was started 1 day before the infusion. Other regimens were described above. He tolerated the treatment well. He has not been treated for the past few years.     Upon evaluating in June 2023, his presentation was consistent with WM in relapse. IgM was 3770 mg/dL. It was decided to started R-andrea. With his history of reaction, first dose ritux was given as an inpt and well tolerated. So far he completed 2 cycles of R-andrea, with C1 andrea at 60 and 70mg/m2, and C2-3 andrea dose at 90 mg/m2.     More recently  C6 BR (at [facility]) with andrea 81 mg/m2 in NOV 2023. Tolerating therapy well with mild fatigue. Able to complete daily activities without limitation. No fever, chills or night sweats. No lymphadenopathy or masses.     Today feels well, mild fatigue. No fever, wt loss, or night sweats. No pain.     Treatment synopsis:  For LPL MZL in 2013  rituximab, chlorambucil, bendamustine. reported to have severe reactions to rituximab, managed with heavy premedication.     For relapsed WM in 2023  First dose of rituximab in 2023, given as an inpatient on 6/25 Saturday (Midnight): Dexamethasone 20 mg PO, Benadryl 50 mg PO, and Pepcid 40 mg PO  Sunday (8 am): Dexamethasone 20 mg, Pepcid 40 mg  Sunday (11:30am immediately prior to Infusion): Tylenol 650 mg, Benadryl 50 mg, Solumedrol 40 mg IV  Bendamustine -ritux  C1D1: 6/29/23, C6D1: 11/16/2023        Objective   BSA: 2.22 meters squared  /75 (BP Location: Right  arm, Patient Position: Sitting)   Pulse 61   Temp 36.6 °C (97.9 °F) (Temporal)   Resp 16   Wt 98.2 kg (216 lb 9.6 oz)   SpO2 97%   BMI 29.96 kg/m²      has a past medical history of Aneurysm of other specified arteries (CMS-HCC), Arterial branch occlusion of retina, Decreased white blood cell count, unspecified, Familial hypercholesterolemia, Hairy cell leukemia (Multi), Nonfamilial hypogammaglobulinemia (Multi), Other conditions influencing health status, Other conditions influencing health status, Personal history of diseases of the blood and blood-forming organs and certain disorders involving the immune mechanism, Personal history of diseases of the blood and blood-forming organs and certain disorders involving the immune mechanism, Retinal detachment, and 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   has a past surgical history that includes Cataract extraction (Bilateral, 09/05/2013); Other surgical history (12/04/2020); and Retinal detachment surgery (Left, 01/01/2006).  Family History   Problem Relation Name Age of Onset    Alzheimer's disease Mother      Dementia Mother      Cataracts Mother      Heart failure Father      Cataracts Father      Hypertension Brother      Peripheral vascular disease Brother       Oncology History   Waldenstrom's macroglobulinemia with kappa light chains   7/27/2023 -  Chemotherapy    Bendamustine + RiTUXimab, 28 Day Cycles     10/4/2023 Initial Diagnosis    Waldenstrom's macroglobulinemia with kappa light chains (CMS/HCC)         Carlton Rose  reports that he has never smoked. He has been exposed to tobacco smoke. He has never used smokeless tobacco.  He  reports no history of alcohol use.  He  reports no history of drug use.    Physical Exam  Vitals reviewed.   Constitutional:       Appearance: Normal appearance.   HENT:      Head: Normocephalic and atraumatic.      Nose: Nose normal.      Mouth/Throat:      Mouth: Mucous membranes are moist.       Pharynx: Oropharynx is clear.   Eyes:      Extraocular Movements: Extraocular movements intact.      Pupils: Pupils are equal, round, and reactive to light.   Cardiovascular:      Rate and Rhythm: Normal rate and regular rhythm.      Pulses: Normal pulses.      Heart sounds: Normal heart sounds.   Pulmonary:      Effort: Pulmonary effort is normal.      Breath sounds: Normal breath sounds.   Abdominal:      General: Bowel sounds are normal.      Palpations: Abdomen is soft.   Musculoskeletal:         General: Normal range of motion.      Cervical back: Normal range of motion.   Skin:     General: Skin is warm and dry.   Neurological:      General: No focal deficit present.      Mental Status: He is alert and oriented to person, place, and time.   Psychiatric:         Mood and Affect: Mood normal.         Behavior: Behavior normal.         Thought Content: Thought content normal.         Judgment: Judgment normal.         WBC   Date/Time Value Ref Range Status   10/10/2024 11:41 AM 3.4 (L) 4.4 - 11.3 x10*3/uL Final   06/06/2024 10:45 AM 2.9 (L) 4.4 - 11.3 x10*3/uL Final   03/07/2024 11:24 AM 2.8 (L) 4.4 - 11.3 x10*3/uL Final     nRBC   Date Value Ref Range Status   11/02/2023 0.0 0.0 - 0.0 /100 WBCs Final   06/26/2023 0.0 0.0 - 0.0 /100 WBC Final   06/25/2023 0.0 0.0 - 0.0 /100 WBC Final   06/24/2023 0.0 0.0 - 0.0 /100 WBC Final     RBC   Date Value Ref Range Status   10/10/2024 4.04 (L) 4.50 - 5.90 x10*6/uL Final   06/06/2024 3.84 (L) 4.50 - 5.90 x10*6/uL Final   03/07/2024 3.89 (L) 4.50 - 5.90 x10*6/uL Final     Hemoglobin   Date Value Ref Range Status   10/10/2024 13.1 (L) 13.5 - 17.5 g/dL Final   06/06/2024 12.4 (L) 13.5 - 17.5 g/dL Final   03/07/2024 12.6 (L) 13.5 - 17.5 g/dL Final     Hematocrit   Date Value Ref Range Status   10/10/2024 39.6 (L) 41.0 - 52.0 % Final   06/06/2024 36.7 (L) 41.0 - 52.0 % Final   03/07/2024 38.0 (L) 41.0 - 52.0 % Final     MCV   Date/Time Value Ref Range Status   10/10/2024 11:41  AM 98 80 - 100 fL Final   06/06/2024 10:45 AM 96 80 - 100 fL Final   03/07/2024 11:24 AM 98 80 - 100 fL Final     MCH   Date/Time Value Ref Range Status   10/10/2024 11:41 AM 32.4 26.0 - 34.0 pg Final   06/06/2024 10:45 AM 32.3 26.0 - 34.0 pg Final   03/07/2024 11:24 AM 32.4 26.0 - 34.0 pg Final     MCHC   Date/Time Value Ref Range Status   10/10/2024 11:41 AM 33.1 32.0 - 36.0 g/dL Final   06/06/2024 10:45 AM 33.8 32.0 - 36.0 g/dL Final   03/07/2024 11:24 AM 33.2 32.0 - 36.0 g/dL Final     RDW   Date/Time Value Ref Range Status   10/10/2024 11:41 AM 12.4 11.5 - 14.5 % Final   06/06/2024 10:45 AM 12.6 11.5 - 14.5 % Final   03/07/2024 11:24 AM 12.4 11.5 - 14.5 % Final     Platelets   Date/Time Value Ref Range Status   10/10/2024 11:41  (L) 150 - 450 x10*3/uL Final   06/06/2024 10:45  150 - 450 x10*3/uL Final   03/07/2024 11:24  (L) 150 - 450 x10*3/uL Final     MPV   Date/Time Value Ref Range Status   10/19/2023 09:48 AM 9.2 7.5 - 11.5 fL Final     Neutrophils %   Date/Time Value Ref Range Status   10/10/2024 11:41 AM 71.3 40.0 - 80.0 % Final   06/06/2024 10:45 AM 73.0 40.0 - 80.0 % Final   03/07/2024 11:24 AM 71.6 40.0 - 80.0 % Final     Immature Granulocytes %, Automated   Date/Time Value Ref Range Status   10/10/2024 11:41 AM 0.3 0.0 - 0.9 % Final     Comment:     Immature Granulocyte Count (IG) includes promyelocytes, myelocytes and metamyelocytes but does not include bands. Percent differential counts (%) should be interpreted in the context of the absolute cell counts (cells/UL).   06/06/2024 10:45 AM 0.0 0.0 - 0.9 % Final     Comment:     Immature Granulocyte Count (IG) includes promyelocytes, myelocytes and metamyelocytes but does not include bands. Percent differential counts (%) should be interpreted in the context of the absolute cell counts (cells/UL).   03/07/2024 11:24 AM 0.0 0.0 - 0.9 % Final     Comment:     Immature Granulocyte Count (IG) includes promyelocytes, myelocytes and  metamyelocytes but does not include bands. Percent differential counts (%) should be interpreted in the context of the absolute cell counts (cells/UL).     Lymphocytes %   Date/Time Value Ref Range Status   10/10/2024 11:41 AM 15.7 13.0 - 44.0 % Final   06/06/2024 10:45 AM 10.4 13.0 - 44.0 % Final   03/07/2024 11:24 AM 12.2 13.0 - 44.0 % Final     Monocytes %   Date/Time Value Ref Range Status   10/10/2024 11:41 AM 11.5 2.0 - 10.0 % Final   06/06/2024 10:45 AM 13.5 2.0 - 10.0 % Final   03/07/2024 11:24 AM 14.4 2.0 - 10.0 % Final     Eosinophils %   Date/Time Value Ref Range Status   10/10/2024 11:41 AM 0.9 0.0 - 6.0 % Final   06/06/2024 10:45 AM 2.8 0.0 - 6.0 % Final   03/07/2024 11:24 AM 1.1 0.0 - 6.0 % Final     Basophils %   Date/Time Value Ref Range Status   10/10/2024 11:41 AM 0.3 0.0 - 2.0 % Final   06/06/2024 10:45 AM 0.3 0.0 - 2.0 % Final   03/07/2024 11:24 AM 0.7 0.0 - 2.0 % Final     Neutrophils Absolute   Date/Time Value Ref Range Status   10/10/2024 11:41 AM 2.41 1.60 - 5.50 x10*3/uL Final     Comment:     Percent differential counts (%) should be interpreted in the context of the absolute cell counts (cells/uL).   06/06/2024 10:45 AM 2.11 1.60 - 5.50 x10*3/uL Final     Comment:     Percent differential counts (%) should be interpreted in the context of the absolute cell counts (cells/uL).   03/07/2024 11:24 AM 1.99 1.60 - 5.50 x10*3/uL Final     Comment:     Percent differential counts (%) should be interpreted in the context of the absolute cell counts (cells/uL).     Immature Granulocytes Absolute, Automated   Date/Time Value Ref Range Status   10/10/2024 11:41 AM 0.01 0.00 - 0.50 x10*3/uL Final   06/06/2024 10:45 AM 0.00 0.00 - 0.50 x10*3/uL Final   03/07/2024 11:24 AM 0.00 0.00 - 0.50 x10*3/uL Final     Lymphocytes Absolute   Date/Time Value Ref Range Status   10/10/2024 11:41 AM 0.53 (L) 0.80 - 3.00 x10*3/uL Final   06/06/2024 10:45 AM 0.30 (L) 0.80 - 3.00 x10*3/uL Final   03/07/2024 11:24 AM 0.34  "(L) 0.80 - 3.00 x10*3/uL Final     Monocytes Absolute   Date/Time Value Ref Range Status   10/10/2024 11:41 AM 0.39 0.05 - 0.80 x10*3/uL Final   06/06/2024 10:45 AM 0.39 0.05 - 0.80 x10*3/uL Final   03/07/2024 11:24 AM 0.40 0.05 - 0.80 x10*3/uL Final     Eosinophils Absolute   Date/Time Value Ref Range Status   10/10/2024 11:41 AM 0.03 0.00 - 0.40 x10*3/uL Final   06/06/2024 10:45 AM 0.08 0.00 - 0.40 x10*3/uL Final   03/07/2024 11:24 AM 0.03 0.00 - 0.40 x10*3/uL Final     Basophils Absolute   Date/Time Value Ref Range Status   10/10/2024 11:41 AM 0.01 0.00 - 0.10 x10*3/uL Final   06/06/2024 10:45 AM 0.01 0.00 - 0.10 x10*3/uL Final   03/07/2024 11:24 AM 0.02 0.00 - 0.10 x10*3/uL Final       No components found for: \"PT\"  aPTT   Date/Time Value Ref Range Status   06/24/2023 10:18 PM 28 27 - 38 sec Final     Comment:     Note new reference range as of 6/20/2023 at 10:00am.   03/07/2023 11:33 AM 35 26 - 39 sec Final     Comment:       THE APTT IS NO LONGER USED FOR MONITORING     UNFRACTIONATED HEPARIN THERAPY.    FOR MONITORING HEPARIN THERAPY,     USE THE HEPARIN ASSAY.         Assessment/Plan       WM  -Based on the BMBx in 2020, the MYD88 mutation, and the increase in IgM and viscosity, the diagnosis is consistent with WM.   -IgM has been steadily increasing, from the low of 1070 in Jan 2021 to 3770 mg/dL in June 2023.   -In addition, both hgb and PLT are mildly decreased.   -Given his age, this may be an ideal time to intervene, with the goal to reduce disease burden, prevent disease related complications.   -Treatment of 6 R-KIRA was tolerated well, and response is satisfactory, c/w MA. Peak IgM 3770, now 915 mg/dL, representing a 75% reduction. Very mild anemia and thrombocytopenia.   -10/18: IGM continue to decrease now 632. No other sign of relapse.      #Treatment  -Discussed BTKi, vs rituximab, and R-Kira.   -patient is more interested in a treatment with a finite duration. Therefore discussed thoroughly " the AEs of rituximab, bendamustine, and the drugs in combination. In particular, risks of severe/fatal infections were discussed. PML was also discussed. Severe and protracted cytoepenia or aplasia were also discussed. Patient consented.   -Appropriate testing for HBV and other prophylaxis will be taken, including G-CSF, Bactrim for PJP, and acyclovir.   -Given severe reaction to ritux, he will receive 1st dose of ritux as an inpatient. Subsequently he will receive R-Anthony as an outpatient.   -The above approach proved safe. He tolerated 6 cycles of R-anthony.   -Not on treatment any more.     #conjunctival hemorrhage  -Asymptomatic. Not associated with headache or any evidence of WM relapse.   -He is instructed to see Ophthalmology if this worsens.       Plan 10/18:  -RTC 3 Mon.   -repeat labs. Monitor PLT and IGM.     Time spent: 25min, >50% on counseling and care coordination.        Problem List Items Addressed This Visit             ICD-10-CM    Waldenstrom's macroglobulinemia with kappa light chains C88.00        Manny Lopez MD PhD

## 2025-01-17 ENCOUNTER — LAB (OUTPATIENT)
Dept: LAB | Facility: HOSPITAL | Age: 85
End: 2025-01-17
Payer: MEDICARE

## 2025-01-17 DIAGNOSIS — C88.00 WALDENSTROM'S MACROGLOBULINEMIA WITH KAPPA LIGHT CHAINS: ICD-10-CM

## 2025-01-17 LAB
ALBUMIN SERPL BCP-MCNC: 3.8 G/DL (ref 3.4–5)
ALP SERPL-CCNC: 96 U/L (ref 33–136)
ALT SERPL W P-5'-P-CCNC: 10 U/L (ref 10–52)
ANION GAP SERPL CALC-SCNC: 12 MMOL/L (ref 10–20)
AST SERPL W P-5'-P-CCNC: 20 U/L (ref 9–39)
BASOPHILS # BLD AUTO: 0.01 X10*3/UL (ref 0–0.1)
BASOPHILS NFR BLD AUTO: 0.3 %
BILIRUB SERPL-MCNC: 0.5 MG/DL (ref 0–1.2)
BUN SERPL-MCNC: 18 MG/DL (ref 6–23)
CALCIUM SERPL-MCNC: 8.8 MG/DL (ref 8.6–10.3)
CHLORIDE SERPL-SCNC: 102 MMOL/L (ref 98–107)
CO2 SERPL-SCNC: 30 MMOL/L (ref 21–32)
CREAT SERPL-MCNC: 1.19 MG/DL (ref 0.5–1.3)
EGFRCR SERPLBLD CKD-EPI 2021: 61 ML/MIN/1.73M*2
EOSINOPHIL # BLD AUTO: 0.03 X10*3/UL (ref 0–0.4)
EOSINOPHIL NFR BLD AUTO: 0.8 %
ERYTHROCYTE [DISTWIDTH] IN BLOOD BY AUTOMATED COUNT: 12 % (ref 11.5–14.5)
GLUCOSE SERPL-MCNC: 81 MG/DL (ref 74–99)
HCT VFR BLD AUTO: 40.1 % (ref 41–52)
HGB BLD-MCNC: 13.1 G/DL (ref 13.5–17.5)
IGA SERPL-MCNC: <7 MG/DL (ref 70–400)
IGG SERPL-MCNC: 194 MG/DL (ref 700–1600)
IGM SERPL-MCNC: 650 MG/DL (ref 40–230)
IMM GRANULOCYTES # BLD AUTO: 0 X10*3/UL (ref 0–0.5)
IMM GRANULOCYTES NFR BLD AUTO: 0 % (ref 0–0.9)
LDH SERPL L TO P-CCNC: 189 U/L (ref 84–246)
LYMPHOCYTES # BLD AUTO: 0.51 X10*3/UL (ref 0.8–3)
LYMPHOCYTES NFR BLD AUTO: 13.3 %
MCH RBC QN AUTO: 31.6 PG (ref 26–34)
MCHC RBC AUTO-ENTMCNC: 32.7 G/DL (ref 32–36)
MCV RBC AUTO: 97 FL (ref 80–100)
MONOCYTES # BLD AUTO: 0.39 X10*3/UL (ref 0.05–0.8)
MONOCYTES NFR BLD AUTO: 10.2 %
NEUTROPHILS # BLD AUTO: 2.9 X10*3/UL (ref 1.6–5.5)
NEUTROPHILS NFR BLD AUTO: 75.4 %
PLATELET # BLD AUTO: 132 X10*3/UL (ref 150–450)
POTASSIUM SERPL-SCNC: 4.2 MMOL/L (ref 3.5–5.3)
PROT SERPL-MCNC: 6.1 G/DL (ref 6.4–8.2)
RBC # BLD AUTO: 4.15 X10*6/UL (ref 4.5–5.9)
SODIUM SERPL-SCNC: 140 MMOL/L (ref 136–145)
WBC # BLD AUTO: 3.8 X10*3/UL (ref 4.4–11.3)

## 2025-01-17 PROCEDURE — 85025 COMPLETE CBC W/AUTO DIFF WBC: CPT

## 2025-01-17 PROCEDURE — 82784 ASSAY IGA/IGD/IGG/IGM EACH: CPT | Mod: PORLAB

## 2025-01-17 PROCEDURE — 84075 ASSAY ALKALINE PHOSPHATASE: CPT

## 2025-01-17 PROCEDURE — 36415 COLL VENOUS BLD VENIPUNCTURE: CPT

## 2025-01-17 PROCEDURE — 83615 LACTATE (LD) (LDH) ENZYME: CPT

## 2025-01-24 ENCOUNTER — OFFICE VISIT (OUTPATIENT)
Dept: HEMATOLOGY/ONCOLOGY | Facility: HOSPITAL | Age: 85
End: 2025-01-24
Payer: MEDICARE

## 2025-01-24 VITALS
SYSTOLIC BLOOD PRESSURE: 137 MMHG | HEART RATE: 60 BPM | OXYGEN SATURATION: 97 % | DIASTOLIC BLOOD PRESSURE: 66 MMHG | BODY MASS INDEX: 30.19 KG/M2 | RESPIRATION RATE: 16 BRPM | WEIGHT: 218.3 LBS | TEMPERATURE: 96.1 F

## 2025-01-24 DIAGNOSIS — C88.00 WALDENSTROM'S MACROGLOBULINEMIA WITH KAPPA LIGHT CHAINS: ICD-10-CM

## 2025-01-24 PROCEDURE — 1126F AMNT PAIN NOTED NONE PRSNT: CPT | Performed by: INTERNAL MEDICINE

## 2025-01-24 PROCEDURE — 1157F ADVNC CARE PLAN IN RCRD: CPT | Performed by: INTERNAL MEDICINE

## 2025-01-24 PROCEDURE — 99213 OFFICE O/P EST LOW 20 MIN: CPT | Performed by: INTERNAL MEDICINE

## 2025-01-24 ASSESSMENT — PAIN SCALES - GENERAL: PAINLEVEL_OUTOF10: 0-NO PAIN

## 2025-01-24 NOTE — PROGRESS NOTES
Patient ID: Carlton Rose is a 84 y.o. male.  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 He was previously treated with plasmapheresis followed by rituximab, but had severe infusion reaction. He then switched a different premedication regimen, with dexamethasone that was started 1 day before the infusion. Other regimens were described above. He tolerated the treatment well. He has not been treated for the past few years.     Upon evaluating in June 2023, his presentation was consistent with WM in relapse. IgM was 3770 mg/dL. It was decided to started R-andrea. With his history of reaction, first dose ritux was given as an inpt and well tolerated. So far he completed 2 cycles of R-andrea, with C1 andrea at 60 and 70mg/m2, and C2-3 andrea dose at 90 mg/m2.     More recently  C6 BR (at [facility]) with andrea 81 mg/m2 in NOV 2023. Tolerating therapy well with mild fatigue. Able to complete daily activities without limitation. No fever, chills or night sweats. No lymphadenopathy or masses.     Today feels well, mild fatigue. No fever, wt loss, or night sweats. No pain.     Treatment synopsis:  For LPL MZL in 2013  rituximab, chlorambucil, bendamustine. reported to have severe reactions to rituximab, managed with heavy premedication.     For relapsed WM in 2023  First dose of rituximab in 2023, given as an inpatient on 6/25 Saturday (Midnight): Dexamethasone 20 mg PO, Benadryl 50 mg PO, and Pepcid 40 mg PO  Sunday (8 am): Dexamethasone 20 mg, Pepcid 40 mg  Sunday (11:30am immediately prior to Infusion): Tylenol 650 mg, Benadryl 50 mg, Solumedrol 40 mg IV  Bendamustine -ritux  C1D1: 6/29/23, C6D1: 11/16/2023        Objective   BSA: 2.23 meters squared  /66 (BP  Location: Left arm, Patient Position: Sitting, BP Cuff Size: Large adult)   Pulse 60   Temp 35.6 °C (96.1 °F) (Skin)   Resp 16   Wt 99 kg (218 lb 4.8 oz)   SpO2 97%   BMI 30.19 kg/m²      has a past medical history of Aneurysm of other specified arteries (CMS-HCC), Arterial branch occlusion of retina, Decreased white blood cell count, unspecified, Familial hypercholesterolemia, Hairy cell leukemia (Multi), Nonfamilial hypogammaglobulinemia (Multi), Other conditions influencing health status, Other conditions influencing health status, Personal history of diseases of the blood and blood-forming organs and certain disorders involving the immune mechanism, Personal history of diseases of the blood and blood-forming organs and certain disorders involving the immune mechanism, Retinal detachment, and 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   has a past surgical history that includes Cataract extraction (Bilateral, 09/05/2013); Other surgical history (12/04/2020); and Retinal detachment surgery (Left, 01/01/2006).  Family History   Problem Relation Name Age of Onset    Alzheimer's disease Mother      Dementia Mother      Cataracts Mother      Heart failure Father      Cataracts Father      Hypertension Brother      Peripheral vascular disease Brother       Oncology History   Waldenstrom's macroglobulinemia with kappa light chains   7/27/2023 -  Chemotherapy    Bendamustine + RiTUXimab, 28 Day Cycles     10/4/2023 Initial Diagnosis    Waldenstrom's macroglobulinemia with kappa light chains (CMS/HCC)         Carlton Rose  reports that he has never smoked. He has been exposed to tobacco smoke. He has never used smokeless tobacco.  He  reports no history of alcohol use.  He  reports no history of drug use.    Physical Exam  Vitals reviewed.   Constitutional:       Appearance: Normal appearance.   HENT:      Head: Normocephalic and atraumatic.      Nose: Nose normal.      Mouth/Throat:       Mouth: Mucous membranes are moist.      Pharynx: Oropharynx is clear.   Eyes:      Extraocular Movements: Extraocular movements intact.      Pupils: Pupils are equal, round, and reactive to light.   Cardiovascular:      Rate and Rhythm: Normal rate and regular rhythm.      Pulses: Normal pulses.      Heart sounds: Normal heart sounds.   Pulmonary:      Effort: Pulmonary effort is normal.      Breath sounds: Normal breath sounds.   Abdominal:      General: Bowel sounds are normal.      Palpations: Abdomen is soft.   Musculoskeletal:         General: Normal range of motion.      Cervical back: Normal range of motion.   Skin:     General: Skin is warm and dry.   Neurological:      General: No focal deficit present.      Mental Status: He is alert and oriented to person, place, and time.   Psychiatric:         Mood and Affect: Mood normal.         Behavior: Behavior normal.         Thought Content: Thought content normal.         Judgment: Judgment normal.       WBC   Date/Time Value Ref Range Status   01/17/2025 11:38 AM 3.8 (L) 4.4 - 11.3 x10*3/uL Final   10/10/2024 11:41 AM 3.4 (L) 4.4 - 11.3 x10*3/uL Final   06/06/2024 10:45 AM 2.9 (L) 4.4 - 11.3 x10*3/uL Final     nRBC   Date Value Ref Range Status   11/02/2023 0.0 0.0 - 0.0 /100 WBCs Final   06/26/2023 0.0 0.0 - 0.0 /100 WBC Final   06/25/2023 0.0 0.0 - 0.0 /100 WBC Final   06/24/2023 0.0 0.0 - 0.0 /100 WBC Final     RBC   Date Value Ref Range Status   01/17/2025 4.15 (L) 4.50 - 5.90 x10*6/uL Final   10/10/2024 4.04 (L) 4.50 - 5.90 x10*6/uL Final   06/06/2024 3.84 (L) 4.50 - 5.90 x10*6/uL Final     Hemoglobin   Date Value Ref Range Status   01/17/2025 13.1 (L) 13.5 - 17.5 g/dL Final   10/10/2024 13.1 (L) 13.5 - 17.5 g/dL Final   06/06/2024 12.4 (L) 13.5 - 17.5 g/dL Final     Hematocrit   Date Value Ref Range Status   01/17/2025 40.1 (L) 41.0 - 52.0 % Final   10/10/2024 39.6 (L) 41.0 - 52.0 % Final   06/06/2024 36.7 (L) 41.0 - 52.0 % Final     MCV   Date/Time Value  Ref Range Status   01/17/2025 11:38 AM 97 80 - 100 fL Final   10/10/2024 11:41 AM 98 80 - 100 fL Final   06/06/2024 10:45 AM 96 80 - 100 fL Final     MCH   Date/Time Value Ref Range Status   01/17/2025 11:38 AM 31.6 26.0 - 34.0 pg Final   10/10/2024 11:41 AM 32.4 26.0 - 34.0 pg Final   06/06/2024 10:45 AM 32.3 26.0 - 34.0 pg Final     MCHC   Date/Time Value Ref Range Status   01/17/2025 11:38 AM 32.7 32.0 - 36.0 g/dL Final   10/10/2024 11:41 AM 33.1 32.0 - 36.0 g/dL Final   06/06/2024 10:45 AM 33.8 32.0 - 36.0 g/dL Final     RDW   Date/Time Value Ref Range Status   01/17/2025 11:38 AM 12.0 11.5 - 14.5 % Final   10/10/2024 11:41 AM 12.4 11.5 - 14.5 % Final   06/06/2024 10:45 AM 12.6 11.5 - 14.5 % Final     Platelets   Date/Time Value Ref Range Status   01/17/2025 11:38  (L) 150 - 450 x10*3/uL Final   10/10/2024 11:41  (L) 150 - 450 x10*3/uL Final   06/06/2024 10:45  150 - 450 x10*3/uL Final     MPV   Date/Time Value Ref Range Status   10/19/2023 09:48 AM 9.2 7.5 - 11.5 fL Final     Neutrophils %   Date/Time Value Ref Range Status   01/17/2025 11:38 AM 75.4 40.0 - 80.0 % Final   10/10/2024 11:41 AM 71.3 40.0 - 80.0 % Final   06/06/2024 10:45 AM 73.0 40.0 - 80.0 % Final     Immature Granulocytes %, Automated   Date/Time Value Ref Range Status   01/17/2025 11:38 AM 0.0 0.0 - 0.9 % Final     Comment:     Immature Granulocyte Count (IG) includes promyelocytes, myelocytes and metamyelocytes but does not include bands. Percent differential counts (%) should be interpreted in the context of the absolute cell counts (cells/UL).   10/10/2024 11:41 AM 0.3 0.0 - 0.9 % Final     Comment:     Immature Granulocyte Count (IG) includes promyelocytes, myelocytes and metamyelocytes but does not include bands. Percent differential counts (%) should be interpreted in the context of the absolute cell counts (cells/UL).   06/06/2024 10:45 AM 0.0 0.0 - 0.9 % Final     Comment:     Immature Granulocyte Count (IG) includes  promyelocytes, myelocytes and metamyelocytes but does not include bands. Percent differential counts (%) should be interpreted in the context of the absolute cell counts (cells/UL).     Lymphocytes %   Date/Time Value Ref Range Status   01/17/2025 11:38 AM 13.3 13.0 - 44.0 % Final   10/10/2024 11:41 AM 15.7 13.0 - 44.0 % Final   06/06/2024 10:45 AM 10.4 13.0 - 44.0 % Final     Monocytes %   Date/Time Value Ref Range Status   01/17/2025 11:38 AM 10.2 2.0 - 10.0 % Final   10/10/2024 11:41 AM 11.5 2.0 - 10.0 % Final   06/06/2024 10:45 AM 13.5 2.0 - 10.0 % Final     Eosinophils %   Date/Time Value Ref Range Status   01/17/2025 11:38 AM 0.8 0.0 - 6.0 % Final   10/10/2024 11:41 AM 0.9 0.0 - 6.0 % Final   06/06/2024 10:45 AM 2.8 0.0 - 6.0 % Final     Basophils %   Date/Time Value Ref Range Status   01/17/2025 11:38 AM 0.3 0.0 - 2.0 % Final   10/10/2024 11:41 AM 0.3 0.0 - 2.0 % Final   06/06/2024 10:45 AM 0.3 0.0 - 2.0 % Final     Neutrophils Absolute   Date/Time Value Ref Range Status   01/17/2025 11:38 AM 2.90 1.60 - 5.50 x10*3/uL Final     Comment:     Percent differential counts (%) should be interpreted in the context of the absolute cell counts (cells/uL).   10/10/2024 11:41 AM 2.41 1.60 - 5.50 x10*3/uL Final     Comment:     Percent differential counts (%) should be interpreted in the context of the absolute cell counts (cells/uL).   06/06/2024 10:45 AM 2.11 1.60 - 5.50 x10*3/uL Final     Comment:     Percent differential counts (%) should be interpreted in the context of the absolute cell counts (cells/uL).     Immature Granulocytes Absolute, Automated   Date/Time Value Ref Range Status   01/17/2025 11:38 AM 0.00 0.00 - 0.50 x10*3/uL Final   10/10/2024 11:41 AM 0.01 0.00 - 0.50 x10*3/uL Final   06/06/2024 10:45 AM 0.00 0.00 - 0.50 x10*3/uL Final     Lymphocytes Absolute   Date/Time Value Ref Range Status   01/17/2025 11:38 AM 0.51 (L) 0.80 - 3.00 x10*3/uL Final   10/10/2024 11:41 AM 0.53 (L) 0.80 - 3.00 x10*3/uL  "Final   06/06/2024 10:45 AM 0.30 (L) 0.80 - 3.00 x10*3/uL Final     Monocytes Absolute   Date/Time Value Ref Range Status   01/17/2025 11:38 AM 0.39 0.05 - 0.80 x10*3/uL Final   10/10/2024 11:41 AM 0.39 0.05 - 0.80 x10*3/uL Final   06/06/2024 10:45 AM 0.39 0.05 - 0.80 x10*3/uL Final     Eosinophils Absolute   Date/Time Value Ref Range Status   01/17/2025 11:38 AM 0.03 0.00 - 0.40 x10*3/uL Final   10/10/2024 11:41 AM 0.03 0.00 - 0.40 x10*3/uL Final   06/06/2024 10:45 AM 0.08 0.00 - 0.40 x10*3/uL Final     Basophils Absolute   Date/Time Value Ref Range Status   01/17/2025 11:38 AM 0.01 0.00 - 0.10 x10*3/uL Final   10/10/2024 11:41 AM 0.01 0.00 - 0.10 x10*3/uL Final   06/06/2024 10:45 AM 0.01 0.00 - 0.10 x10*3/uL Final       No components found for: \"PT\"  aPTT   Date/Time Value Ref Range Status   06/24/2023 10:18 PM 28 27 - 38 sec Final     Comment:     Note new reference range as of 6/20/2023 at 10:00am.   03/07/2023 11:33 AM 35 26 - 39 sec Final     Comment:       THE APTT IS NO LONGER USED FOR MONITORING     UNFRACTIONATED HEPARIN THERAPY.    FOR MONITORING HEPARIN THERAPY,     USE THE HEPARIN ASSAY.         Assessment/Plan       WM  -Based on the BMBx in 2020, the MYD88 mutation, and the increase in IgM and viscosity, the diagnosis is consistent with WM.   -IgM has been steadily increasing, from the low of 1070 in Jan 2021 to 3770 mg/dL in June 2023.   -In addition, both hgb and PLT are mildly decreased.   -Given his age, this may be an ideal time to intervene, with the goal to reduce disease burden, prevent disease related complications.   -Treatment of 6 R-KIRA was tolerated well, and response is satisfactory, c/w RI. Peak IgM 3770, now 915 mg/dL, representing a 75% reduction. Very mild anemia and thrombocytopenia.   -10/18: IGM continue to decrease now 632. No other sign of relapse.      #Treatment  -Discussed BTKi, vs rituximab, and R-Kira.   -patient is more interested in a treatment with a finite duration. " Therefore discussed thoroughly the AEs of rituximab, bendamustine, and the drugs in combination. In particular, risks of severe/fatal infections were discussed. PML was also discussed. Severe and protracted cytoepenia or aplasia were also discussed. Patient consented.   -Appropriate testing for HBV and other prophylaxis will be taken, including G-CSF, Bactrim for PJP, and acyclovir.   -Given severe reaction to ritux, he will receive 1st dose of ritux as an inpatient. Subsequently he will receive R-Anthony as an outpatient.   -The above approach proved safe. He tolerated 6 cycles of R-anthony.   -Not on treatment any more.     #conjunctival hemorrhage  -Asymptomatic. Not associated with headache or any evidence of WM relapse.   -He is instructed to see Ophthalmology if this worsens.       Plan 1/24/2025  -repeat labs before next visit.  -rtc 6mon.   -Declined IVIG due to concern of COVID donors.     10/18:  -RTC 3 Mon.   -repeat labs. Monitor PLT and IGM.     Time spent: 25min, >50% on counseling and care coordination.        Problem List Items Addressed This Visit             ICD-10-CM    Waldenstrom's macroglobulinemia with kappa light chains C88.00        Manny Lopez MD PhD

## 2025-01-27 ENCOUNTER — APPOINTMENT (OUTPATIENT)
Dept: OPHTHALMOLOGY | Facility: CLINIC | Age: 85
End: 2025-01-27
Payer: MEDICARE

## 2025-01-27 DIAGNOSIS — H53.15 VISUAL DISTORTIONS OF SHAPE AND SIZE: ICD-10-CM

## 2025-01-27 DIAGNOSIS — H34.239 BRANCH RETINAL ARTERY OCCLUSION, UNSPECIFIED LATERALITY: Primary | ICD-10-CM

## 2025-01-27 PROCEDURE — 99213 OFFICE O/P EST LOW 20 MIN: CPT | Performed by: OPHTHALMOLOGY

## 2025-01-27 ASSESSMENT — VISUAL ACUITY
OD_CC+: -2
OS_CC+: -1
OS_CC: 20/50
METHOD: SNELLEN - LINEAR
OD_CC: 20/25
CORRECTION_TYPE: GLASSES

## 2025-01-27 ASSESSMENT — CUP TO DISC RATIO
OD_RATIO: .3
OS_RATIO: .85

## 2025-01-27 ASSESSMENT — EXTERNAL EXAM - LEFT EYE: OS_EXAM: NORMAL

## 2025-01-27 ASSESSMENT — EXTERNAL EXAM - RIGHT EYE: OD_EXAM: NORMAL

## 2025-01-27 ASSESSMENT — TONOMETRY
OD_IOP_MMHG: 12
OS_IOP_MMHG: 13
IOP_METHOD: GOLDMANN APPLANATION

## 2025-01-27 ASSESSMENT — SLIT LAMP EXAM - LIDS
COMMENTS: GOOD POSITION
COMMENTS: GOOD POSITION

## 2025-01-27 ASSESSMENT — ENCOUNTER SYMPTOMS: EYES NEGATIVE: 1

## 2025-01-27 NOTE — PROGRESS NOTES
Assessment/Plan   Diagnoses and all orders for this visit:  Branch retinal artery occlusion, unspecified laterality  -     OCT, Retina - OU - Both Eyes  Visual distortions of shape and size  -     OCT, Retina - OU - Both Eyes       Impression    1 C88.0 Waldenstrom's macroglobulinemia with kappa light chains-Worsening  2 C91.40 Hairy cell leukemia-Stable  3 H34.239 Brao (branch retinal artery occlusion)-Stable  4 Z86.69 History of retinal detachment-Stable  5 H52.203 Astigmatism, bilateral-Stable  6 H52.4 Presbyopia-Stable          Discussion    Impression:  1. hairy cell lukemia  2. 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

## 2025-01-31 ENCOUNTER — APPOINTMENT (OUTPATIENT)
Dept: HEMATOLOGY/ONCOLOGY | Facility: HOSPITAL | Age: 85
End: 2025-01-31
Payer: MEDICARE

## 2025-06-09 ENCOUNTER — APPOINTMENT (OUTPATIENT)
Dept: OPHTHALMOLOGY | Age: 85
End: 2025-06-09
Payer: MEDICARE

## 2025-07-17 ENCOUNTER — LAB (OUTPATIENT)
Dept: LAB | Facility: HOSPITAL | Age: 85
End: 2025-07-17
Payer: MEDICARE

## 2025-07-17 DIAGNOSIS — C88.00 WALDENSTROM'S MACROGLOBULINEMIA WITH KAPPA LIGHT CHAINS: ICD-10-CM

## 2025-07-17 LAB
ALBUMIN SERPL BCP-MCNC: 4 G/DL (ref 3.4–5)
ALP SERPL-CCNC: 80 U/L (ref 33–136)
ALT SERPL W P-5'-P-CCNC: 12 U/L (ref 10–52)
ANION GAP SERPL CALC-SCNC: 11 MMOL/L (ref 10–20)
AST SERPL W P-5'-P-CCNC: 18 U/L (ref 9–39)
BASOPHILS # BLD AUTO: 0.02 X10*3/UL (ref 0–0.1)
BASOPHILS NFR BLD AUTO: 0.7 %
BILIRUB SERPL-MCNC: 0.6 MG/DL (ref 0–1.2)
BUN SERPL-MCNC: 18 MG/DL (ref 6–23)
CALCIUM SERPL-MCNC: 9 MG/DL (ref 8.6–10.3)
CHLORIDE SERPL-SCNC: 104 MMOL/L (ref 98–107)
CO2 SERPL-SCNC: 29 MMOL/L (ref 21–32)
CREAT SERPL-MCNC: 1.37 MG/DL (ref 0.5–1.3)
EGFRCR SERPLBLD CKD-EPI 2021: 52 ML/MIN/1.73M*2
EOSINOPHIL # BLD AUTO: 0.03 X10*3/UL (ref 0–0.4)
EOSINOPHIL NFR BLD AUTO: 1 %
ERYTHROCYTE [DISTWIDTH] IN BLOOD BY AUTOMATED COUNT: 12.3 % (ref 11.5–14.5)
GLUCOSE SERPL-MCNC: 98 MG/DL (ref 74–99)
HCT VFR BLD AUTO: 37.6 % (ref 41–52)
HGB BLD-MCNC: 12.5 G/DL (ref 13.5–17.5)
IGA SERPL-MCNC: <7 MG/DL (ref 70–400)
IGG SERPL-MCNC: 179 MG/DL (ref 700–1600)
IGM SERPL-MCNC: 496 MG/DL (ref 40–230)
IMM GRANULOCYTES # BLD AUTO: 0 X10*3/UL (ref 0–0.5)
IMM GRANULOCYTES NFR BLD AUTO: 0 % (ref 0–0.9)
LDH SERPL L TO P-CCNC: 275 U/L (ref 84–246)
LYMPHOCYTES # BLD AUTO: 0.48 X10*3/UL (ref 0.8–3)
LYMPHOCYTES NFR BLD AUTO: 15.9 %
MCH RBC QN AUTO: 31.9 PG (ref 26–34)
MCHC RBC AUTO-ENTMCNC: 33.2 G/DL (ref 32–36)
MCV RBC AUTO: 96 FL (ref 80–100)
MONOCYTES # BLD AUTO: 0.38 X10*3/UL (ref 0.05–0.8)
MONOCYTES NFR BLD AUTO: 12.6 %
NEUTROPHILS # BLD AUTO: 2.1 X10*3/UL (ref 1.6–5.5)
NEUTROPHILS NFR BLD AUTO: 69.8 %
PLATELET # BLD AUTO: 112 X10*3/UL (ref 150–450)
POTASSIUM SERPL-SCNC: 4 MMOL/L (ref 3.5–5.3)
PROT SERPL-MCNC: 6 G/DL (ref 6.4–8.2)
RBC # BLD AUTO: 3.92 X10*6/UL (ref 4.5–5.9)
SODIUM SERPL-SCNC: 140 MMOL/L (ref 136–145)
WBC # BLD AUTO: 3 X10*3/UL (ref 4.4–11.3)

## 2025-07-17 PROCEDURE — 82784 ASSAY IGA/IGD/IGG/IGM EACH: CPT | Mod: PORLAB

## 2025-07-17 PROCEDURE — 85025 COMPLETE CBC W/AUTO DIFF WBC: CPT

## 2025-07-17 PROCEDURE — 36415 COLL VENOUS BLD VENIPUNCTURE: CPT

## 2025-07-17 PROCEDURE — 83615 LACTATE (LD) (LDH) ENZYME: CPT

## 2025-07-17 PROCEDURE — 84075 ASSAY ALKALINE PHOSPHATASE: CPT

## 2025-07-25 ENCOUNTER — OFFICE VISIT (OUTPATIENT)
Dept: HEMATOLOGY/ONCOLOGY | Facility: HOSPITAL | Age: 85
End: 2025-07-25
Payer: MEDICARE

## 2025-07-25 VITALS
DIASTOLIC BLOOD PRESSURE: 69 MMHG | RESPIRATION RATE: 16 BRPM | SYSTOLIC BLOOD PRESSURE: 131 MMHG | TEMPERATURE: 97.2 F | BODY MASS INDEX: 28.79 KG/M2 | WEIGHT: 208.2 LBS | OXYGEN SATURATION: 95 % | HEART RATE: 77 BPM

## 2025-07-25 DIAGNOSIS — C88.00 WALDENSTROM'S MACROGLOBULINEMIA WITH KAPPA LIGHT CHAINS: ICD-10-CM

## 2025-07-25 PROCEDURE — 1126F AMNT PAIN NOTED NONE PRSNT: CPT | Performed by: INTERNAL MEDICINE

## 2025-07-25 PROCEDURE — 99213 OFFICE O/P EST LOW 20 MIN: CPT | Performed by: INTERNAL MEDICINE

## 2025-07-25 ASSESSMENT — PAIN SCALES - GENERAL: PAINLEVEL_OUTOF10: 0-NO PAIN

## 2025-07-25 NOTE — PROGRESS NOTES
Patient ID: Carlton Rose is a 84 y.o. male.  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 He was previously treated with plasmapheresis followed by rituximab, but had severe infusion reaction. He then switched a different premedication regimen, with dexamethasone that was started 1 day before the infusion. Other regimens were described above. He tolerated the treatment well. He has not been treated for the past few years.     Upon evaluating in June 2023, his presentation was consistent with WM in relapse. IgM was 3770 mg/dL. It was decided to started R-andrea. With his history of reaction, first dose ritux was given as an inpt and well tolerated. So far he completed 2 cycles of R-andrea, with C1 andrea at 60 and 70mg/m2, and C2-3 andrea dose at 90 mg/m2.     More recently  C6 BR (at [facility]) with andrea 81 mg/m2 in NOV 2023. Tolerating therapy well with mild fatigue. Able to complete daily activities without limitation. No fever, chills or night sweats. No lymphadenopathy or masses.     Today feels well, mild fatigue. No fever, wt loss, or night sweats. No pain.     Treatment synopsis:  For LPL MZL in 2013  rituximab, chlorambucil, bendamustine. reported to have severe reactions to rituximab, managed with heavy premedication.     For relapsed WM in 2023  First dose of rituximab in 2023, given as an inpatient on 6/25 Saturday (Midnight): Dexamethasone 20 mg PO, Benadryl 50 mg PO, and Pepcid 40 mg PO  Sunday (8 am): Dexamethasone 20 mg, Pepcid 40 mg  Sunday (11:30am immediately prior to Infusion): Tylenol 650 mg, Benadryl 50 mg, Solumedrol 40 mg IV  Bendamustine -ritux  C1D1: 6/29/23, C6D1: 11/16/2023          Objective   BSA: 2.18 meters  squared  /69 (BP Location: Right arm, Patient Position: Sitting, BP Cuff Size: Large adult)   Pulse 77   Temp 36.2 °C (97.2 °F) (Skin)   Resp 16   Wt 94.4 kg (208 lb 3.2 oz)   SpO2 95%   BMI 28.79 kg/m²      has a past medical history of Aneurysm of other specified arteries, Arterial branch occlusion of retina, Decreased white blood cell count, unspecified, Familial hypercholesterolemia, Hairy cell leukemia, Nonfamilial hypogammaglobulinemia (Multi), Other conditions influencing health status, Other conditions influencing health status, Personal history of diseases of the blood and blood-forming organs and certain disorders involving the immune mechanism, Personal history of diseases of the blood and blood-forming organs and certain disorders involving the immune mechanism, Retinal detachment, and Waldenstrom's macroglobulinemia with kappa light chains.   has a past surgical history that includes Cataract extraction (Bilateral, 09/05/2013); Other surgical history (12/04/2020); and Retinal detachment surgery (Left, 01/01/2006).  Family History   Problem Relation Name Age of Onset    Alzheimer's disease Mother      Dementia Mother      Cataracts Mother      Heart failure Father      Cataracts Father      Hypertension Brother      Peripheral vascular disease Brother       Oncology History   Waldenstrom's macroglobulinemia with kappa light chains   7/27/2023 -  Chemotherapy    Bendamustine + RiTUXimab, 28 Day Cycles     10/4/2023 Initial Diagnosis    Waldenstrom's macroglobulinemia with kappa light chains (CMS/HCC)         Carlton Rose  reports that he has never smoked. He has been exposed to tobacco smoke. He has never used smokeless tobacco.  He  reports no history of alcohol use.  He  reports no history of drug use.    Physical Exam  Vitals reviewed.   Constitutional:       Appearance: Normal appearance.   HENT:      Head: Normocephalic and atraumatic.      Nose: Nose normal.      Mouth/Throat:       Mouth: Mucous membranes are moist.      Pharynx: Oropharynx is clear.     Eyes:      Extraocular Movements: Extraocular movements intact.      Pupils: Pupils are equal, round, and reactive to light.       Cardiovascular:      Rate and Rhythm: Normal rate and regular rhythm.      Pulses: Normal pulses.      Heart sounds: Normal heart sounds.   Pulmonary:      Effort: Pulmonary effort is normal.      Breath sounds: Normal breath sounds.   Abdominal:      General: Bowel sounds are normal.      Palpations: Abdomen is soft.     Musculoskeletal:         General: Normal range of motion.      Cervical back: Normal range of motion.     Skin:     General: Skin is warm and dry.     Neurological:      General: No focal deficit present.      Mental Status: He is alert and oriented to person, place, and time.     Psychiatric:         Mood and Affect: Mood normal.         Behavior: Behavior normal.         Thought Content: Thought content normal.         Judgment: Judgment normal.         WBC   Date/Time Value Ref Range Status   07/17/2025 11:20 AM 3.0 (L) 4.4 - 11.3 x10*3/uL Final   01/17/2025 11:38 AM 3.8 (L) 4.4 - 11.3 x10*3/uL Final   10/10/2024 11:41 AM 3.4 (L) 4.4 - 11.3 x10*3/uL Final     nRBC   Date Value Ref Range Status   11/02/2023 0.0 0.0 - 0.0 /100 WBCs Final   06/26/2023 0.0 0.0 - 0.0 /100 WBC Final   06/25/2023 0.0 0.0 - 0.0 /100 WBC Final   06/24/2023 0.0 0.0 - 0.0 /100 WBC Final     RBC   Date Value Ref Range Status   07/17/2025 3.92 (L) 4.50 - 5.90 x10*6/uL Final   01/17/2025 4.15 (L) 4.50 - 5.90 x10*6/uL Final   10/10/2024 4.04 (L) 4.50 - 5.90 x10*6/uL Final     Hemoglobin   Date Value Ref Range Status   07/17/2025 12.5 (L) 13.5 - 17.5 g/dL Final   01/17/2025 13.1 (L) 13.5 - 17.5 g/dL Final   10/10/2024 13.1 (L) 13.5 - 17.5 g/dL Final     Hematocrit   Date Value Ref Range Status   07/17/2025 37.6 (L) 41.0 - 52.0 % Final   01/17/2025 40.1 (L) 41.0 - 52.0 % Final   10/10/2024 39.6 (L) 41.0 - 52.0 % Final     MCV    Date/Time Value Ref Range Status   07/17/2025 11:20 AM 96 80 - 100 fL Final   01/17/2025 11:38 AM 97 80 - 100 fL Final   10/10/2024 11:41 AM 98 80 - 100 fL Final     MCH   Date/Time Value Ref Range Status   07/17/2025 11:20 AM 31.9 26.0 - 34.0 pg Final   01/17/2025 11:38 AM 31.6 26.0 - 34.0 pg Final   10/10/2024 11:41 AM 32.4 26.0 - 34.0 pg Final     MCHC   Date/Time Value Ref Range Status   07/17/2025 11:20 AM 33.2 32.0 - 36.0 g/dL Final   01/17/2025 11:38 AM 32.7 32.0 - 36.0 g/dL Final   10/10/2024 11:41 AM 33.1 32.0 - 36.0 g/dL Final     RDW   Date/Time Value Ref Range Status   07/17/2025 11:20 AM 12.3 11.5 - 14.5 % Final   01/17/2025 11:38 AM 12.0 11.5 - 14.5 % Final   10/10/2024 11:41 AM 12.4 11.5 - 14.5 % Final     Platelets   Date/Time Value Ref Range Status   07/17/2025 11:20  (L) 150 - 450 x10*3/uL Final   01/17/2025 11:38  (L) 150 - 450 x10*3/uL Final   10/10/2024 11:41  (L) 150 - 450 x10*3/uL Final     MPV   Date/Time Value Ref Range Status   10/19/2023 09:48 AM 9.2 7.5 - 11.5 fL Final     Neutrophils %   Date/Time Value Ref Range Status   07/17/2025 11:20 AM 69.8 40.0 - 80.0 % Final   01/17/2025 11:38 AM 75.4 40.0 - 80.0 % Final   10/10/2024 11:41 AM 71.3 40.0 - 80.0 % Final     Immature Granulocytes %, Automated   Date/Time Value Ref Range Status   07/17/2025 11:20 AM 0.0 0.0 - 0.9 % Final     Comment:     Immature Granulocyte Count (IG) includes promyelocytes, myelocytes and metamyelocytes but does not include bands. Percent differential counts (%) should be interpreted in the context of the absolute cell counts (cells/UL).   01/17/2025 11:38 AM 0.0 0.0 - 0.9 % Final     Comment:     Immature Granulocyte Count (IG) includes promyelocytes, myelocytes and metamyelocytes but does not include bands. Percent differential counts (%) should be interpreted in the context of the absolute cell counts (cells/UL).   10/10/2024 11:41 AM 0.3 0.0 - 0.9 % Final     Comment:     Immature Granulocyte  Count (IG) includes promyelocytes, myelocytes and metamyelocytes but does not include bands. Percent differential counts (%) should be interpreted in the context of the absolute cell counts (cells/UL).     Lymphocytes %   Date/Time Value Ref Range Status   07/17/2025 11:20 AM 15.9 13.0 - 44.0 % Final   01/17/2025 11:38 AM 13.3 13.0 - 44.0 % Final   10/10/2024 11:41 AM 15.7 13.0 - 44.0 % Final     Monocytes %   Date/Time Value Ref Range Status   07/17/2025 11:20 AM 12.6 2.0 - 10.0 % Final   01/17/2025 11:38 AM 10.2 2.0 - 10.0 % Final   10/10/2024 11:41 AM 11.5 2.0 - 10.0 % Final     Eosinophils %   Date/Time Value Ref Range Status   07/17/2025 11:20 AM 1.0 0.0 - 6.0 % Final   01/17/2025 11:38 AM 0.8 0.0 - 6.0 % Final   10/10/2024 11:41 AM 0.9 0.0 - 6.0 % Final     Basophils %   Date/Time Value Ref Range Status   07/17/2025 11:20 AM 0.7 0.0 - 2.0 % Final   01/17/2025 11:38 AM 0.3 0.0 - 2.0 % Final   10/10/2024 11:41 AM 0.3 0.0 - 2.0 % Final     Neutrophils Absolute   Date/Time Value Ref Range Status   07/17/2025 11:20 AM 2.10 1.60 - 5.50 x10*3/uL Final     Comment:     Percent differential counts (%) should be interpreted in the context of the absolute cell counts (cells/uL).   01/17/2025 11:38 AM 2.90 1.60 - 5.50 x10*3/uL Final     Comment:     Percent differential counts (%) should be interpreted in the context of the absolute cell counts (cells/uL).   10/10/2024 11:41 AM 2.41 1.60 - 5.50 x10*3/uL Final     Comment:     Percent differential counts (%) should be interpreted in the context of the absolute cell counts (cells/uL).     Immature Granulocytes Absolute, Automated   Date/Time Value Ref Range Status   07/17/2025 11:20 AM 0.00 0.00 - 0.50 x10*3/uL Final   01/17/2025 11:38 AM 0.00 0.00 - 0.50 x10*3/uL Final   10/10/2024 11:41 AM 0.01 0.00 - 0.50 x10*3/uL Final     Lymphocytes Absolute   Date/Time Value Ref Range Status   07/17/2025 11:20 AM 0.48 (L) 0.80 - 3.00 x10*3/uL Final   01/17/2025 11:38 AM 0.51 (L) 0.80  "- 3.00 x10*3/uL Final   10/10/2024 11:41 AM 0.53 (L) 0.80 - 3.00 x10*3/uL Final     Monocytes Absolute   Date/Time Value Ref Range Status   07/17/2025 11:20 AM 0.38 0.05 - 0.80 x10*3/uL Final   01/17/2025 11:38 AM 0.39 0.05 - 0.80 x10*3/uL Final   10/10/2024 11:41 AM 0.39 0.05 - 0.80 x10*3/uL Final     Eosinophils Absolute   Date/Time Value Ref Range Status   07/17/2025 11:20 AM 0.03 0.00 - 0.40 x10*3/uL Final   01/17/2025 11:38 AM 0.03 0.00 - 0.40 x10*3/uL Final   10/10/2024 11:41 AM 0.03 0.00 - 0.40 x10*3/uL Final     Basophils Absolute   Date/Time Value Ref Range Status   07/17/2025 11:20 AM 0.02 0.00 - 0.10 x10*3/uL Final   01/17/2025 11:38 AM 0.01 0.00 - 0.10 x10*3/uL Final   10/10/2024 11:41 AM 0.01 0.00 - 0.10 x10*3/uL Final       No components found for: \"PT\"  aPTT   Date/Time Value Ref Range Status   06/24/2023 10:18 PM 28 27 - 38 sec Final     Comment:     Note new reference range as of 6/20/2023 at 10:00am.   03/07/2023 11:33 AM 35 26 - 39 sec Final     Comment:       THE APTT IS NO LONGER USED FOR MONITORING     UNFRACTIONATED HEPARIN THERAPY.    FOR MONITORING HEPARIN THERAPY,     USE THE HEPARIN ASSAY.         Assessment/Plan       WM  -Based on the BMBx in 2020, the MYD88 mutation, and the increase in IgM and viscosity, the diagnosis is consistent with WM.   -IgM has been steadily increasing, from the low of 1070 in Jan 2021 to 3770 mg/dL in June 2023.   -In addition, both hgb and PLT are mildly decreased.   -Given his age, this may be an ideal time to intervene, with the goal to reduce disease burden, prevent disease related complications.   -Treatment of 6 R-KIRA was tolerated well, and response is satisfactory, c/w IA. Peak IgM 3770, now 915 mg/dL, representing a 75% reduction. Very mild anemia and thrombocytopenia.   -10/18: IGM continue to decrease now 632. No other sign of relapse.      #Treatment  -Discussed BTKi, vs rituximab, and R-Kira.   -patient is more interested in a treatment with a " finite duration. Therefore discussed thoroughly the AEs of rituximab, bendamustine, and the drugs in combination. In particular, risks of severe/fatal infections were discussed. PML was also discussed. Severe and protracted cytoepenia or aplasia were also discussed. Patient consented.   -Appropriate testing for HBV and other prophylaxis will be taken, including G-CSF, Bactrim for PJP, and acyclovir.   -Given severe reaction to ritux, he will receive 1st dose of ritux as an inpatient. Subsequently he will receive R-Anthony as an outpatient.   -The above approach proved safe. He tolerated 6 cycles of R-anthony.   -Not on treatment any more.   -1/24/2025: IgM now 650 (peak 3770 in 6/2023 pre-treatment), Mild leukopenia and thrombocytopenia, likely due to recent treatment. Anemia improved, hgb near WNL.     #conjunctival hemorrhage  -Asymptomatic. Not associated with headache or any evidence of WM relapse.   -He is instructed to see Ophthalmology if this worsens.       Plan 7/25/2025  -CT AP non contrast.  -He declined acyclovir, IVIG, and vaccines.   -RTC 3 mon.    1/24/2025  -repeat labs before next visit.  -rtc 6mon.   -Declined IVIG due to concern of COVID donors.       Time spent: 25min, >50% on counseling and care coordination.        Problem List Items Addressed This Visit           ICD-10-CM    Waldenstrom's macroglobulinemia with kappa light chains C88.00        Manny Lopez MD PhD

## 2025-09-08 ENCOUNTER — APPOINTMENT (OUTPATIENT)
Dept: OPHTHALMOLOGY | Age: 85
End: 2025-09-08
Payer: MEDICARE